# Patient Record
Sex: FEMALE | Race: BLACK OR AFRICAN AMERICAN | NOT HISPANIC OR LATINO | Employment: FULL TIME | URBAN - METROPOLITAN AREA
[De-identification: names, ages, dates, MRNs, and addresses within clinical notes are randomized per-mention and may not be internally consistent; named-entity substitution may affect disease eponyms.]

---

## 2018-07-01 ENCOUNTER — HOSPITAL ENCOUNTER (EMERGENCY)
Facility: HOSPITAL | Age: 33
Discharge: HOME | End: 2018-07-01
Attending: EMERGENCY MEDICINE
Payer: MEDICAID

## 2018-07-01 VITALS
BODY MASS INDEX: 36.29 KG/M2 | OXYGEN SATURATION: 98 % | TEMPERATURE: 98 F | SYSTOLIC BLOOD PRESSURE: 131 MMHG | WEIGHT: 180 LBS | RESPIRATION RATE: 16 BRPM | HEIGHT: 59 IN | HEART RATE: 72 BPM | DIASTOLIC BLOOD PRESSURE: 67 MMHG

## 2018-07-01 DIAGNOSIS — O03.4 INEVITABLE ABORTION: Primary | ICD-10-CM

## 2018-07-01 LAB
ALBUMIN SERPL BCP-MCNC: 3.6 G/DL
ALP SERPL-CCNC: 57 U/L
ALT SERPL W/O P-5'-P-CCNC: 13 U/L
ANION GAP SERPL CALC-SCNC: 11 MMOL/L
AST SERPL-CCNC: 12 U/L
B-HCG UR QL: POSITIVE
BACTERIA #/AREA URNS HPF: ABNORMAL /HPF
BASOPHILS # BLD AUTO: 0.01 K/UL
BASOPHILS NFR BLD: 0.2 %
BILIRUB SERPL-MCNC: 0.5 MG/DL
BILIRUB UR QL STRIP: ABNORMAL
BUN SERPL-MCNC: 7 MG/DL
CALCIUM SERPL-MCNC: 9.4 MG/DL
CHLORIDE SERPL-SCNC: 104 MMOL/L
CLARITY UR: CLEAR
CO2 SERPL-SCNC: 19 MMOL/L
COLOR UR: ABNORMAL
CREAT SERPL-MCNC: 0.8 MG/DL
CTP QC/QA: YES
DIFFERENTIAL METHOD: ABNORMAL
EOSINOPHIL # BLD AUTO: 0 K/UL
EOSINOPHIL NFR BLD: 0.7 %
ERYTHROCYTE [DISTWIDTH] IN BLOOD BY AUTOMATED COUNT: 13.8 %
EST. GFR  (AFRICAN AMERICAN): >60 ML/MIN/1.73 M^2
EST. GFR  (NON AFRICAN AMERICAN): >60 ML/MIN/1.73 M^2
GLUCOSE SERPL-MCNC: 99 MG/DL
GLUCOSE UR QL STRIP: NEGATIVE
HCG INTACT+B SERPL-ACNC: 8527 MIU/ML
HCT VFR BLD AUTO: 32.9 %
HGB BLD-MCNC: 10.6 G/DL
HGB UR QL STRIP: ABNORMAL
HYALINE CASTS #/AREA URNS LPF: 0 /LPF
KETONES UR QL STRIP: NEGATIVE
LEUKOCYTE ESTERASE UR QL STRIP: NEGATIVE
LYMPHOCYTES # BLD AUTO: 1.2 K/UL
LYMPHOCYTES NFR BLD: 28.6 %
MCH RBC QN AUTO: 28.6 PG
MCHC RBC AUTO-ENTMCNC: 32.2 G/DL
MCV RBC AUTO: 89 FL
MICROSCOPIC COMMENT: ABNORMAL
MONOCYTES # BLD AUTO: 0.4 K/UL
MONOCYTES NFR BLD: 9.2 %
NEUTROPHILS # BLD AUTO: 2.7 K/UL
NEUTROPHILS NFR BLD: 61.1 %
NITRITE UR QL STRIP: NEGATIVE
PH UR STRIP: 6 [PH] (ref 5–8)
PLATELET # BLD AUTO: 243 K/UL
PMV BLD AUTO: 11.5 FL
POTASSIUM SERPL-SCNC: 3.4 MMOL/L
PROT SERPL-MCNC: 8.1 G/DL
PROT UR QL STRIP: ABNORMAL
RBC # BLD AUTO: 3.7 M/UL
RBC #/AREA URNS HPF: 40 /HPF (ref 0–4)
SODIUM SERPL-SCNC: 134 MMOL/L
SP GR UR STRIP: >=1.03 (ref 1–1.03)
SQUAMOUS #/AREA URNS HPF: 4 /HPF
URN SPEC COLLECT METH UR: ABNORMAL
UROBILINOGEN UR STRIP-ACNC: NEGATIVE EU/DL
WBC # BLD AUTO: 4.34 K/UL
WBC #/AREA URNS HPF: 1 /HPF (ref 0–5)

## 2018-07-01 PROCEDURE — 99284 EMERGENCY DEPT VISIT MOD MDM: CPT | Mod: 25

## 2018-07-01 PROCEDURE — 84702 CHORIONIC GONADOTROPIN TEST: CPT

## 2018-07-01 PROCEDURE — 80053 COMPREHEN METABOLIC PANEL: CPT

## 2018-07-01 PROCEDURE — 85025 COMPLETE CBC W/AUTO DIFF WBC: CPT

## 2018-07-01 PROCEDURE — 81000 URINALYSIS NONAUTO W/SCOPE: CPT

## 2018-07-01 PROCEDURE — 25000003 PHARM REV CODE 250: Performed by: PHYSICIAN ASSISTANT

## 2018-07-01 PROCEDURE — 96361 HYDRATE IV INFUSION ADD-ON: CPT

## 2018-07-01 PROCEDURE — 81025 URINE PREGNANCY TEST: CPT | Performed by: PHYSICIAN ASSISTANT

## 2018-07-01 PROCEDURE — 96360 HYDRATION IV INFUSION INIT: CPT

## 2018-07-01 RX ORDER — IBUPROFEN 600 MG/1
600 TABLET ORAL EVERY 6 HOURS PRN
Qty: 20 TABLET | Refills: 0 | Status: SHIPPED | OUTPATIENT
Start: 2018-07-01

## 2018-07-01 RX ORDER — SODIUM CHLORIDE 9 MG/ML
125 INJECTION, SOLUTION INTRAVENOUS CONTINUOUS
Status: DISCONTINUED | OUTPATIENT
Start: 2018-07-01 | End: 2018-07-01 | Stop reason: HOSPADM

## 2018-07-01 RX ADMIN — SODIUM CHLORIDE 125 ML/HR: 0.9 INJECTION, SOLUTION INTRAVENOUS at 07:07

## 2018-07-01 NOTE — ED TRIAGE NOTES
Pt presents to ED with complaint of vaginal bleeding onset overnight, noticed this morning, with clots.  Also reports abdominal cramping.  Pt reports 6 weeks pregnant, pt of  at [facility].  States had ultrasound done recently.

## 2018-07-01 NOTE — ED PROVIDER NOTES
Encounter Date: 2018       History     Chief Complaint   Patient presents with    Vaginal Bleeding     6 weeks pregnant started with vaginal bleeding this morning     Afebrile 33-year-old female that is  3 para 1 with current pregnancy at approximately 8 weeks gestation by ultrasound however approximately 10 weeks by her last menstrual period presents to the ED for evaluation of vaginal bleeding.  She states bleeding began this morning upon waking and was noted to be a moderate amount.  She did report passing tissue and clots when she went to use the restroom.  She complains of associated lower abdominal cramping.  She states that her Ob is Dr. Woodward with [facility].  She denies any other symptoms at this time including fever, chills, nausea, vomiting, dysuria, hematuria or vaginal discharge. She has not tried any medications for the symptoms.      The history is provided by the patient.     Review of patient's allergies indicates:  No Known Allergies  History reviewed. No pertinent past medical history.  History reviewed. No pertinent surgical history.  History reviewed. No pertinent family history.  Social History   Substance Use Topics    Smoking status: Never Smoker    Smokeless tobacco: Not on file    Alcohol use Not on file     Review of Systems   Constitutional: Negative for chills and fever.   Respiratory: Negative for shortness of breath.    Cardiovascular: Negative for chest pain.   Gastrointestinal: Positive for abdominal pain (Abdominal cramping). Negative for nausea and vomiting.   Genitourinary: Positive for vaginal bleeding. Negative for dysuria, flank pain, hematuria and vaginal discharge.   Musculoskeletal: Positive for back pain. Negative for arthralgias and myalgias.   Skin: Negative for rash.   Neurological: Negative for weakness, light-headedness and headaches.   Hematological: Does not bruise/bleed easily.       Physical Exam     Initial Vitals [18 0710]   BP Pulse  Resp Temp SpO2   138/75 97 16 98.1 °F (36.7 °C) 99 %      MAP       --         Physical Exam    Nursing note and vitals reviewed.  Constitutional: Vital signs are normal. She appears well-developed and well-nourished. She is cooperative.  Non-toxic appearance. She does not appear ill.   Patient appears anxious and in mild distress   HENT:   Head: Normocephalic and atraumatic.   Eyes: Conjunctivae and lids are normal.   Neck: Neck supple. No neck rigidity.   Cardiovascular: Normal rate and regular rhythm.   Pulmonary/Chest: Breath sounds normal. No respiratory distress. She has no wheezes. She has no rhonchi.   Abdominal: Soft. Normal appearance. There is no tenderness. There is no rigidity, no rebound, no guarding and no CVA tenderness.   Genitourinary: Pelvic exam was performed with patient supine. Uterus is not tender. Right adnexum displays no tenderness. Left adnexum displays no tenderness. There is bleeding in the vagina.   Genitourinary Comments: Scant blood in the vaginal vault; no tissue or clots noted.  Cervical os is open   Musculoskeletal: Normal range of motion.   Neurological: She is alert and oriented to person, place, and time. GCS eye subscore is 4. GCS verbal subscore is 5. GCS motor subscore is 6.   Skin: Skin is warm, dry and intact. No rash noted.   Psychiatric: She has a normal mood and affect. Her speech is normal and behavior is normal. Thought content normal.         ED Course   Procedures  Labs Reviewed   CBC W/ AUTO DIFFERENTIAL - Abnormal; Notable for the following:        Result Value    RBC 3.70 (*)     Hemoglobin 10.6 (*)     Hematocrit 32.9 (*)     All other components within normal limits   COMPREHENSIVE METABOLIC PANEL - Abnormal; Notable for the following:     Sodium 134 (*)     Potassium 3.4 (*)     CO2 19 (*)     All other components within normal limits   URINALYSIS - Abnormal; Notable for the following:     Color, UA Brown (*)     Specific Gravity, UA >=1.030 (*)     Protein, UA  1+ (*)     Bilirubin (UA) 1+ (*)     Occult Blood UA 3+ (*)     All other components within normal limits   URINALYSIS MICROSCOPIC - Abnormal; Notable for the following:     RBC, UA 40 (*)     All other components within normal limits   POCT URINE PREGNANCY - Abnormal; Notable for the following:     POC Preg Test, Ur Positive (*)     All other components within normal limits   HCG, QUANTITATIVE, PREGNANCY          Imaging Results          US OB Less Than 14 Wks with Transvag(xpd (Final result)  Result time 18 10:52:01    Final result by Jesús Steven MD (18 10:52:01)                 Impression:      Pregnancy of unknown location.  No gestational sac is visualized.  No adnexal masses visualized.  Differential considerations include recent , or unseen ectopic pregnancy.  Given beta hCG 8500 and the finding small amount echogenic material with increased vascularity the region the endometrial canal, findings are suspicious for recent  with a small amount of retained products of conception.  Continued follow-up with serial beta HCG is recommended and repeat ultrasound as clinically indicated.      Electronically signed by: Jesús Steven MD  Date:    2018  Time:    10:52             Narrative:    EXAMINATION:  US OB LESS THAN 14 WKS WITH TRANSVAGINAL (XPD)    CLINICAL HISTORY:  Vag Bleeding;    TECHNIQUE:  Transabdominal sonography of the pelvis was performed, followed by transvaginal sonography to better evaluate the uterus and ovaries.    COMPARISON:  None.    FINDINGS:  No gestational sac is visualized.  No evidence of adnexal masses visualized.  There is a small amount of echogenic material within the endometrial canal with increased vascularity which may represent hemorrhagic material.    Right ovary: Normal.    Left ovary: Normal.    Miscellaneous: No Free Fluid.                                 Medical Decision Making:   Initial Assessment:   33-year-old female with  reported pregnancy at at 8 weeks gestation by ultrasound presents to the ED for evaluation of vaginal bleeding that began today.  Patient is overall well-appearing however in some minimal distress secondary to pain. Abdomen is soft and nontender.  exam reveals cervical os open and scant blood in the vaginal vault; no tissue visualized.  Differential Diagnosis:   Differential Diagnosis includes, but is not limited to:  Pregnancy complication (threatened AB, inevitable AB, incomplete Ab, missed AB, ectopic pregnancy, placenta previa)  foreign body, trauma.    Clinical Tests:   Lab Tests: Ordered and Reviewed  Radiological Study: Ordered and Reviewed  ED Management:  I do note I am unable see ultrasound however patient reports that she had confirmedIUP with this pregnancy by ultrasound however as she has an acute change of vaginal bleeding we will obtain blood work and ultrasound today.  Patient has lab work at this facility revealing A positive blood type.  HCG at 8527 an ultrasound with no confirmed IUP an material near the endometrial canal consistent with inevitable AB and low suspicion of ectopic as she did report previous IUP via ultrasound is completed 2 weeks ago.  She did report some lessening and pain in the ED and patient remains hemodynamically stable. We did discuss that she should follow up with her Ob for repeat blood work and ultrasound.  We will send patient home with NSAIDs as there does not appear to be viable pregnancy and this will help her cramping. Strict instructions to follow up with primary care physician or reference provided for further assessment and evaluation. Given instructions to return for any acute symptoms and verbalized understanding of this medical plan.    Other:   I have discussed this case with another health care provider.              Attending Attestation:     Physician Attestation Statement for NP/PA:   I have conducted a face to face encounter with this patient in  addition to the NP/PA, due to NP/PA Request    Other NP/PA Attestation Additions:    History of Present Illness: Agree; 33-year-old female presents emergency department complaining of vaginal bleeding.  She is about 8 weeks by ultrasound.  Reports a moderate amount of bleeding and some lower abdominal cramping pain.  Believe she may have passed some tissue as well as bleeding which was initially heavier menses but is now about the same as her menses.  No other symptoms reported.   Physical Exam: Agree   Medical Decision Making: Agree; ultrasound concerning for completed miscarriage given that the patient reports she had a prior ultrasound at an outside hospital that we cannot view.  Instructed to follow up with OB, informed of results, informed of home management, reasons to return to the emergency room                    Clinical Impression:   The encounter diagnosis was Inevitable .                             SABAS Fraser  18 [id number]       Dontrell Hickman MD  18 [id number]

## 2018-11-23 ENCOUNTER — HOSPITAL ENCOUNTER (EMERGENCY)
Facility: HOSPITAL | Age: 33
Discharge: HOME | End: 2018-11-23
Attending: EMERGENCY MEDICINE
Payer: MEDICAID

## 2018-11-23 VITALS
OXYGEN SATURATION: 99 % | HEIGHT: 59 IN | HEART RATE: 80 BPM | WEIGHT: 157 LBS | TEMPERATURE: 99 F | SYSTOLIC BLOOD PRESSURE: 128 MMHG | DIASTOLIC BLOOD PRESSURE: 76 MMHG | BODY MASS INDEX: 31.65 KG/M2 | RESPIRATION RATE: 18 BRPM

## 2018-11-23 DIAGNOSIS — L73.2 HIDRADENITIS SUPPURATIVA: Primary | ICD-10-CM

## 2018-11-23 LAB
B-HCG UR QL: NEGATIVE
CTP QC/QA: YES

## 2018-11-23 PROCEDURE — 25000003 PHARM REV CODE 250: Performed by: PHYSICIAN ASSISTANT

## 2018-11-23 PROCEDURE — 81025 URINE PREGNANCY TEST: CPT | Performed by: NURSE PRACTITIONER

## 2018-11-23 PROCEDURE — 99284 EMERGENCY DEPT VISIT MOD MDM: CPT | Mod: 25

## 2018-11-23 PROCEDURE — 10060 I&D ABSCESS SIMPLE/SINGLE: CPT

## 2018-11-23 RX ORDER — LIDOCAINE HYDROCHLORIDE 10 MG/ML
10 INJECTION INFILTRATION; PERINEURAL
Status: COMPLETED | OUTPATIENT
Start: 2018-11-23 | End: 2018-11-23

## 2018-11-23 RX ORDER — FLUCONAZOLE 200 MG/1
200 TABLET ORAL ONCE
Qty: 1 TABLET | Refills: 0 | Status: SHIPPED | OUTPATIENT
Start: 2018-11-23 | End: 2018-11-23

## 2018-11-23 RX ORDER — SULFAMETHOXAZOLE AND TRIMETHOPRIM 800; 160 MG/1; MG/1
1 TABLET ORAL
Status: COMPLETED | OUTPATIENT
Start: 2018-11-23 | End: 2018-11-23

## 2018-11-23 RX ORDER — SULFAMETHOXAZOLE AND TRIMETHOPRIM 800; 160 MG/1; MG/1
1 TABLET ORAL 2 TIMES DAILY
Qty: 14 TABLET | Refills: 0 | Status: SHIPPED | OUTPATIENT
Start: 2018-11-23 | End: 2018-11-30

## 2018-11-23 RX ADMIN — LIDOCAINE HYDROCHLORIDE 10 ML: 10 INJECTION, SOLUTION INFILTRATION; PERINEURAL at 04:11

## 2018-11-23 RX ADMIN — SULFAMETHOXAZOLE AND TRIMETHOPRIM 1 TABLET: 800; 160 TABLET ORAL at 04:11

## 2018-11-23 NOTE — ED PROVIDER NOTES
Encounter Date: 11/23/2018       History     Chief Complaint   Patient presents with    Abscess     to right axilla onset one week +rednes +swelling +pain -drainage -fever     Rhea Sandra, a 33 y.o. female that presents to the ED for evaluation of possible abscess to right axillary region x 1 week.  Patient states that she's had an abscess to this area several times in the past.  She states that she has had increased swelling, and anne dness to the site.  She denies any fever.  No treatments tried prior to arrival.          The history is provided by the patient.     Review of patient's allergies indicates:  No Known Allergies  No past medical history on file.  No past surgical history on file.  History reviewed. No pertinent family history.  Social History     Tobacco Use    Smoking status: Never Smoker   Substance Use Topics    Alcohol use: Not on file    Drug use: Not on file     Review of Systems   Constitutional: Negative for chills and fever.   Gastrointestinal: Negative for nausea and vomiting.   Skin: Positive for color change.   Allergic/Immunologic: Negative for immunocompromised state.   Neurological: Negative for weakness and light-headedness.   Psychiatric/Behavioral: Negative for agitation and confusion.   All other systems reviewed and are negative.      Physical Exam     Initial Vitals [11/23/18 1513]   BP Pulse Resp Temp SpO2   127/78 82 18 98.8 °F (37.1 °C) 100 %      MAP       --         Physical Exam    Nursing note and vitals reviewed.  Constitutional: She appears well-developed and well-nourished.   HENT:   Head: Normocephalic and atraumatic.   Right Ear: External ear normal.   Left Ear: External ear normal.   Nose: Nose normal.   Mouth/Throat: Oropharynx is clear and moist.   Eyes: Conjunctivae and EOM are normal.   Neck: Normal range of motion.   Cardiovascular: Normal rate, regular rhythm and normal heart sounds. Exam reveals no gallop and no friction rub.    No murmur  heard.  Pulmonary/Chest: Breath sounds normal. No respiratory distress. She has no wheezes. She has no rhonchi. She has no rales.   Abdominal: Soft. Bowel sounds are normal. She exhibits no distension. There is no tenderness. There is no rebound and no guarding.   Musculoskeletal: Normal range of motion.   Neurological: She is alert and oriented to person, place, and time.   Skin: Skin is warm and dry. Capillary refill takes less than 2 seconds. Abscess noted. No rash noted. No erythema.   Induration and fluctuance noted to right axilla measuring approximately 3 cm x 3 cm with erythema spreading to proximal upper extremity about 10 cm total.   Psychiatric: She has a normal mood and affect. Thought content normal.         ED Course   I & D - Incision and Drainage  Date/Time: 11/23/2018 5:37 PM  Location procedure was performed: [facility] EMERGENCY DEPARTMENT  Performed by: Marlene Rust PA-C  Authorized by: Fernando Garvey MD   Consent Done: Yes  Risks and benefits: risks, benefits and alternatives were discussed  Consent given by: patient  Patient identity confirmed: verbally with patient  Type: abscess  Body area: upper extremity (right axilla )  Anesthesia: local infiltration    Anesthesia:  Local Anesthetic: lidocaine 1% without epinephrine  Risk factor: underlying major vessel  Scalpel size: 11  Incision type: single straight  Complexity: simple  Drainage: pus  Drainage amount: moderate  Wound treatment: incision,  deloculation,  expression of material and  wound packed  Packing material: 1/4 in gauze  Complications: No  Specimens: No  Implants: No  Patient tolerance: Patient tolerated the procedure well with no immediate complications        Labs Reviewed   POCT URINE PREGNANCY          Imaging Results    None          Medical Decision Making:   Initial Assessment:   Swelling, induration and erythema to right axilla  Differential Diagnosis:   Abscess, cellulitis, lymph node swelling  ED  Management:  Patient presents to the ED for evaluation of right axillary swelling and redness.  Symptoms and exam most consistent with hidradenitis suppurativa.  Area was I indeed with expression of moderate amount of pus.  Area was de loculated and packing was placed.  She got her 1st dose of Bactrim in the ER.  She will be woke given a prescription for Bactrim and was instructed to take ibuprofen and Tylenol as needed for pain.  Instructed to return in 2-3 days for packing removal.  She was given surgeon's name for possible flap procedure.  Given instructions to return including increased redness, fever, drainage is expressed understanding.                      Clinical Impression:   The encounter diagnosis was Hidradenitis suppurativa.                             Marlene Rust PA-C  11/23/18 [id number]

## 2019-09-30 ENCOUNTER — HOSPITAL ENCOUNTER (EMERGENCY)
Facility: HOSPITAL | Age: 34
Discharge: HOME | End: 2019-09-30
Attending: EMERGENCY MEDICINE

## 2019-09-30 VITALS
OXYGEN SATURATION: 99 % | TEMPERATURE: 99 F | RESPIRATION RATE: 18 BRPM | BODY MASS INDEX: 32.72 KG/M2 | DIASTOLIC BLOOD PRESSURE: 66 MMHG | HEART RATE: 75 BPM | WEIGHT: 162 LBS | SYSTOLIC BLOOD PRESSURE: 116 MMHG

## 2019-09-30 DIAGNOSIS — N92.1 MENOMETRORRHAGIA: Primary | ICD-10-CM

## 2019-09-30 LAB
ABO + RH BLD: NORMAL
ALBUMIN SERPL BCP-MCNC: 3.8 G/DL (ref 3.5–5.2)
ALP SERPL-CCNC: 48 U/L (ref 55–135)
ALT SERPL W/O P-5'-P-CCNC: 14 U/L (ref 10–44)
ANION GAP SERPL CALC-SCNC: 11 MMOL/L (ref 8–16)
AST SERPL-CCNC: 21 U/L (ref 10–40)
B-HCG UR QL: NEGATIVE
BACTERIA #/AREA URNS HPF: ABNORMAL /HPF
BACTERIA GENITAL QL WET PREP: ABNORMAL
BASOPHILS # BLD AUTO: 0.01 K/UL (ref 0–0.2)
BASOPHILS NFR BLD: 0.4 % (ref 0–1.9)
BILIRUB SERPL-MCNC: 0.4 MG/DL (ref 0.1–1)
BILIRUB UR QL STRIP: NEGATIVE
BUN SERPL-MCNC: 9 MG/DL (ref 6–20)
CALCIUM SERPL-MCNC: 9.1 MG/DL (ref 8.7–10.5)
CHLORIDE SERPL-SCNC: 108 MMOL/L (ref 95–110)
CLARITY UR: CLEAR
CLUE CELLS VAG QL WET PREP: ABNORMAL
CO2 SERPL-SCNC: 18 MMOL/L (ref 23–29)
COLOR UR: YELLOW
CREAT SERPL-MCNC: 0.7 MG/DL (ref 0.5–1.4)
CTP QC/QA: YES
DIFFERENTIAL METHOD: ABNORMAL
EOSINOPHIL # BLD AUTO: 0.1 K/UL (ref 0–0.5)
EOSINOPHIL NFR BLD: 1.9 % (ref 0–8)
ERYTHROCYTE [DISTWIDTH] IN BLOOD BY AUTOMATED COUNT: 13.9 % (ref 11.5–14.5)
EST. GFR  (AFRICAN AMERICAN): >60 ML/MIN/1.73 M^2
EST. GFR  (NON AFRICAN AMERICAN): >60 ML/MIN/1.73 M^2
FILAMENT FUNGI VAG WET PREP-#/AREA: ABNORMAL
GLUCOSE SERPL-MCNC: 88 MG/DL (ref 70–110)
GLUCOSE UR QL STRIP: NEGATIVE
HCG INTACT+B SERPL-ACNC: <1.2 MIU/ML
HCT VFR BLD AUTO: 36.8 % (ref 37–48.5)
HGB BLD-MCNC: 11.8 G/DL (ref 12–16)
HGB UR QL STRIP: ABNORMAL
KETONES UR QL STRIP: NEGATIVE
LEUKOCYTE ESTERASE UR QL STRIP: NEGATIVE
LYMPHOCYTES # BLD AUTO: 1 K/UL (ref 1–4.8)
LYMPHOCYTES NFR BLD: 39.4 % (ref 18–48)
MCH RBC QN AUTO: 29.2 PG (ref 27–31)
MCHC RBC AUTO-ENTMCNC: 32.1 G/DL (ref 32–36)
MCV RBC AUTO: 91 FL (ref 82–98)
MICROSCOPIC COMMENT: ABNORMAL
MONOCYTES # BLD AUTO: 0.2 K/UL (ref 0.3–1)
MONOCYTES NFR BLD: 8.7 % (ref 4–15)
NEUTROPHILS # BLD AUTO: 1.3 K/UL (ref 1.8–7.7)
NEUTROPHILS NFR BLD: 49.6 % (ref 38–73)
NITRITE UR QL STRIP: NEGATIVE
PH UR STRIP: 6 [PH] (ref 5–8)
PLATELET # BLD AUTO: 217 K/UL (ref 150–350)
PMV BLD AUTO: 12.3 FL (ref 9.2–12.9)
POTASSIUM SERPL-SCNC: 4 MMOL/L (ref 3.5–5.1)
PROT SERPL-MCNC: 8.1 G/DL (ref 6–8.4)
PROT UR QL STRIP: NEGATIVE
RBC # BLD AUTO: 4.04 M/UL (ref 4–5.4)
RBC #/AREA URNS HPF: 50 /HPF (ref 0–4)
SODIUM SERPL-SCNC: 137 MMOL/L (ref 136–145)
SP GR UR STRIP: 1.02 (ref 1–1.03)
SPECIMEN SOURCE: ABNORMAL
T VAGINALIS GENITAL QL WET PREP: ABNORMAL
URN SPEC COLLECT METH UR: ABNORMAL
UROBILINOGEN UR STRIP-ACNC: NEGATIVE EU/DL
WBC # BLD AUTO: 2.64 K/UL (ref 3.9–12.7)
WBC #/AREA URNS HPF: 2 /HPF (ref 0–5)
WBC #/AREA VAG WET PREP: ABNORMAL
YEAST GENITAL QL WET PREP: ABNORMAL

## 2019-09-30 PROCEDURE — 87210 SMEAR WET MOUNT SALINE/INK: CPT

## 2019-09-30 PROCEDURE — 81000 URINALYSIS NONAUTO W/SCOPE: CPT

## 2019-09-30 PROCEDURE — 99285 EMERGENCY DEPT VISIT HI MDM: CPT | Mod: 25

## 2019-09-30 PROCEDURE — 87491 CHLMYD TRACH DNA AMP PROBE: CPT

## 2019-09-30 PROCEDURE — 84702 CHORIONIC GONADOTROPIN TEST: CPT

## 2019-09-30 PROCEDURE — 86901 BLOOD TYPING SEROLOGIC RH(D): CPT

## 2019-09-30 PROCEDURE — 85025 COMPLETE CBC W/AUTO DIFF WBC: CPT

## 2019-09-30 PROCEDURE — 80053 COMPREHEN METABOLIC PANEL: CPT

## 2019-09-30 PROCEDURE — 81025 URINE PREGNANCY TEST: CPT | Performed by: EMERGENCY MEDICINE

## 2019-09-30 NOTE — ED NOTES
Pt given gown and instructed to change into it and remove all clothing from the waist down. Pelvic exam set up and procedure explained to pt.  Pt verbalized understanding

## 2019-09-30 NOTE — ED TRIAGE NOTES
Pt presents to ED today report several + home UPT on Friday. LMP on 8/26/2019. Pt reports + lines were very faint on each test. Pt reports vaginal bleeding onset yesterday.

## 2019-09-30 NOTE — ED PROVIDER NOTES
"Encounter Date: 9/30/2019    SCRIBE #1 NOTE: I, Jesus Ellsworth, am scribing for, and in the presence of, Nicanor Beauchamp MD.       History     Chief Complaint   Patient presents with    Vaginal Bleeding     34 year old female presents to ed cc of vaginal bleeding with mild abdominal cramping that began today patient states is currently pregnant but unsure of how far along has not seen obgyn for this pregnancy      34 year-old AA female presents to the ED for cc vaginal bleeding that started today. Patient reports light vaginal bleeding with associated mild lower abdominal cramping. States she had a recent, questionable positive home pregnancy test (pt states that the line was "faint" and she is not sure if it was positive or not) . States her last menstrual cycle was supposed to be on 08/20. She reports prior history of miscarriages (2). She denies vaginal discharge, dysuria, N/V/D, chest pain, SOB,  symptoms, weakness, fatigue. Pt denies any significant Ob/Gyn hx when questioned.     The history is provided by the patient.     Review of patient's allergies indicates:  No Known Allergies  History reviewed. No pertinent past medical history.  History reviewed. No pertinent surgical history.  History reviewed. No pertinent family history.  Social History     Tobacco Use    Smoking status: Never Smoker   Substance Use Topics    Alcohol use: Not on file    Drug use: Not on file     Review of Systems   Constitutional: Negative for fever.   HENT: Negative for sore throat.    Respiratory: Negative for shortness of breath.    Cardiovascular: Negative for chest pain.   Gastrointestinal: Positive for abdominal pain. Negative for diarrhea, nausea and vomiting.   Genitourinary: Positive for vaginal bleeding. Negative for dysuria, vaginal discharge and vaginal pain.   Musculoskeletal: Negative for back pain, myalgias and neck pain.   Skin: Negative for rash.   Neurological: Negative for weakness.   Hematological: Does not " bruise/bleed easily.   All other systems reviewed and are negative.      Physical Exam     Initial Vitals [09/30/19 0933]   BP Pulse Resp Temp SpO2   128/71 75 20 98.2 °F (36.8 °C) 98 %      MAP       --         Physical Exam    Nursing note and vitals reviewed.  Constitutional: She appears well-developed and well-nourished. No distress.   HENT:   Head: Normocephalic and atraumatic.   Right Ear: External ear normal.   Left Ear: External ear normal.   Eyes: Conjunctivae and EOM are normal. Pupils are equal, round, and reactive to light.   Neck: Normal range of motion. Neck supple.   Cardiovascular: Normal rate, regular rhythm, normal heart sounds and intact distal pulses.   Pulmonary/Chest: Breath sounds normal. No respiratory distress. She has no wheezes.   Abdominal: Soft. She exhibits no distension. There is no tenderness.   No suprapubic tenderness   Genitourinary:   Genitourinary Comments: Positive blood in the vaginal vault.  Some blood noted coming from external os.  No tissue, lesions, or laceration.  No adnexal tenderness.  No CMT   Musculoskeletal: Normal range of motion. She exhibits no edema.   Neurological: She is alert and oriented to person, place, and time.   Skin: Skin is warm and dry.         ED Course   Procedures  Labs Reviewed   CBC W/ AUTO DIFFERENTIAL - Abnormal; Notable for the following components:       Result Value    WBC 2.64 (*)     Hemoglobin 11.8 (*)     Hematocrit 36.8 (*)     Gran # (ANC) 1.3 (*)     Mono # 0.2 (*)     All other components within normal limits   COMPREHENSIVE METABOLIC PANEL - Abnormal; Notable for the following components:    CO2 18 (*)     Alkaline Phosphatase 48 (*)     All other components within normal limits   URINALYSIS, REFLEX TO URINE CULTURE - Abnormal; Notable for the following components:    Occult Blood UA 3+ (*)     All other components within normal limits    Narrative:     Preferred Collection Type->Urine, Clean Catch   URINALYSIS MICROSCOPIC -  Abnormal; Notable for the following components:    RBC, UA 50 (*)     All other components within normal limits    Narrative:     Preferred Collection Type->Urine, Clean Catch   VAGINAL SCREEN - Abnormal; Notable for the following components:    WBC - Vaginal Screen Few (*)     Bacteria - Vaginal Screen Many (*)     All other components within normal limits   C. TRACHOMATIS/N. GONORRHOEAE BY AMP DNA   HCG, QUANTITATIVE, PREGNANCY   POCT URINE PREGNANCY   GROUP & RH          Imaging Results    None          Medical Decision Making:   Clinical Tests:   Lab Tests: Reviewed and Ordered  ED Management:  - UPT neg  - CBC w/diff WNL; H/H stable; no significant leukocytosis  - CMP WNL; no significant electrolyte abnormalities  - B-HCG < 1.2  - ABO/RH: A pos  - UA without findings for infection  - Wet prep negative for BV, Trichomonas, or yeast  - Vaginal exam unremarkable other than scan blood in vaginal vault; no CMT tenderness; no adnexal tenderness on bimanual exam   - Physical exam otherwise unremarkable; no abdominal tenderness or suprapubic tenderness  - Findings most consistent with menometorrhagia,  - Pt stable throughout ED stay; no pain complaints  - Will have pt f/u with her Ob/Gyn in then next 2-3 days for recheck of today's complaints   - No further intervention is indicated at this time after having taken into account the patient's history, physical exam findings, and empirical and objective data obtained during the patient's emergency department workup.   - The patient is at low risk for an emergent medical condition at this time, and I am of the belief that that it is safe to discharge the patient from the emergency department.   - The patient is instructed to follow up as outpatient as indicated on the discharge paperwork.    - I have discussed the specifics of the workup with the patient and the patient has verbalized understanding of the details of the workup, the diagnosis, the treatment plan, and the  need for outpatient follow-up.    - Although the patient has no emergent etiology today this does not preclude the development of an emergent condition so, in addition, I have advised the patient that they can return to the ED and/or activate EMS at any time with worsening of their symptoms, change of their symptoms, or with any other medical complaint.    - The patient remained comfortable and stable during their visit in the ED.    - Discharge and follow-up instructions discussed with the patient who expressed understanding and willingness to comply with my recommendations.  - Results of all emergency department tests  discussed thoroughly with patient; all patient questions answered; pt in agreement with plan  - Pt instructed to follow up with PCP in 2-3 days for recheck of today's complaints  - Pt given strict emergency department return precautions for any new or worsening of symptoms  - Pt discharged from the emergency department in stable condition, in no acute distress                          Clinical Impression:       ICD-10-CM ICD-9-CM   1. Menometrorrhagia N92.1 626.2           Disposition:   Disposition: Discharged  Condition: Stable    I, Nicanor Beauchamp,  personally performed the services described in this documentation. All medical record entries made by the scribe were at my direction and in my presence.  I have reviewed the chart and agree that the record reflects my personal performance and is accurate and complete. Nicanor Beauchamp M.D. 2:09 PM09/30/2019               Nicanor Beauchamp MD  09/30/19 [id number]

## 2019-10-01 LAB
C TRACH DNA SPEC QL NAA+PROBE: NOT DETECTED
N GONORRHOEA DNA SPEC QL NAA+PROBE: NOT DETECTED

## 2020-06-04 ENCOUNTER — LAB VISIT (OUTPATIENT)
Dept: PRIMARY CARE CLINIC | Facility: OTHER | Age: 35
End: 2020-06-04
Payer: MEDICAID

## 2020-06-04 DIAGNOSIS — Z11.59 SCREENING FOR VIRAL DISEASE: ICD-10-CM

## 2020-06-04 PROCEDURE — U0003 INFECTIOUS AGENT DETECTION BY NUCLEIC ACID (DNA OR RNA); SEVERE ACUTE RESPIRATORY SYNDROME CORONAVIRUS 2 (SARS-COV-2) (CORONAVIRUS DISEASE [COVID-19]), AMPLIFIED PROBE TECHNIQUE, MAKING USE OF HIGH THROUGHPUT TECHNOLOGIES AS DESCRIBED BY CMS-2020-01-R: HCPCS

## 2020-06-05 LAB — SARS-COV-2 RNA RESP QL NAA+PROBE: NOT DETECTED

## 2021-01-01 ENCOUNTER — HOSPITAL ENCOUNTER (EMERGENCY)
Facility: HOSPITAL | Age: 36
Discharge: HOME | End: 2021-01-01
Attending: EMERGENCY MEDICINE
Payer: MEDICAID

## 2021-01-01 VITALS
HEIGHT: 59 IN | OXYGEN SATURATION: 99 % | HEART RATE: 82 BPM | BODY MASS INDEX: 34.88 KG/M2 | RESPIRATION RATE: 18 BRPM | SYSTOLIC BLOOD PRESSURE: 130 MMHG | TEMPERATURE: 98 F | WEIGHT: 173 LBS | DIASTOLIC BLOOD PRESSURE: 78 MMHG

## 2021-01-01 DIAGNOSIS — U07.1 COVID-19 VIRUS DETECTED: ICD-10-CM

## 2021-01-01 DIAGNOSIS — U07.1 COVID-19: Primary | ICD-10-CM

## 2021-01-01 LAB
CTP QC/QA: YES
SARS-COV-2 RDRP RESP QL NAA+PROBE: POSITIVE

## 2021-01-01 PROCEDURE — U0002 COVID-19 LAB TEST NON-CDC: HCPCS | Performed by: EMERGENCY MEDICINE

## 2021-01-01 PROCEDURE — 99282 EMERGENCY DEPT VISIT SF MDM: CPT

## 2021-01-01 PROCEDURE — 99283 EMERGENCY DEPT VISIT LOW MDM: CPT

## 2021-01-01 RX ORDER — ALBUTEROL SULFATE 90 UG/1
1-2 AEROSOL, METERED RESPIRATORY (INHALATION) EVERY 6 HOURS PRN
Qty: 8 G | Refills: 0 | Status: SHIPPED | OUTPATIENT
Start: 2021-01-01 | End: 2022-01-01

## 2021-07-01 ENCOUNTER — PATIENT MESSAGE (OUTPATIENT)
Dept: ADMINISTRATIVE | Facility: OTHER | Age: 36
End: 2021-07-01

## 2021-12-27 ENCOUNTER — HOSPITAL ENCOUNTER (EMERGENCY)
Facility: HOSPITAL | Age: 36
Discharge: HOME | End: 2021-12-27
Attending: EMERGENCY MEDICINE
Payer: MEDICAID

## 2021-12-27 VITALS
BODY MASS INDEX: 34.27 KG/M2 | RESPIRATION RATE: 20 BRPM | HEIGHT: 59 IN | HEART RATE: 89 BPM | TEMPERATURE: 99 F | DIASTOLIC BLOOD PRESSURE: 72 MMHG | WEIGHT: 170 LBS | TEMPERATURE: 98 F | DIASTOLIC BLOOD PRESSURE: 69 MMHG | OXYGEN SATURATION: 98 % | SYSTOLIC BLOOD PRESSURE: 121 MMHG | RESPIRATION RATE: 18 BRPM | WEIGHT: 170 LBS | HEIGHT: 59 IN | HEART RATE: 77 BPM | SYSTOLIC BLOOD PRESSURE: 134 MMHG | OXYGEN SATURATION: 100 % | BODY MASS INDEX: 34.27 KG/M2

## 2021-12-27 DIAGNOSIS — U07.1 LAB TEST POSITIVE FOR DETECTION OF COVID-19 VIRUS: Primary | ICD-10-CM

## 2021-12-27 DIAGNOSIS — O03.9 MISCARRIAGE: ICD-10-CM

## 2021-12-27 DIAGNOSIS — O20.0 THREATENED MISCARRIAGE: ICD-10-CM

## 2021-12-27 DIAGNOSIS — N93.9 VAGINAL BLEEDING: Primary | ICD-10-CM

## 2021-12-27 LAB
B-HCG UR QL: POSITIVE
BACTERIA #/AREA URNS HPF: NORMAL /HPF
BILIRUB UR QL STRIP: NEGATIVE
CLARITY UR: CLEAR
COLOR UR: YELLOW
CTP QC/QA: YES
GLUCOSE UR QL STRIP: NEGATIVE
HCG INTACT+B SERPL-ACNC: 8296 MIU/ML
HGB UR QL STRIP: ABNORMAL
KETONES UR QL STRIP: NEGATIVE
LEUKOCYTE ESTERASE UR QL STRIP: NEGATIVE
MICROSCOPIC COMMENT: NORMAL
NITRITE UR QL STRIP: NEGATIVE
PH UR STRIP: 6 [PH] (ref 5–8)
PROT UR QL STRIP: NEGATIVE
RBC #/AREA URNS HPF: 1 /HPF (ref 0–4)
SARS-COV-2 RDRP RESP QL NAA+PROBE: POSITIVE
SP GR UR STRIP: 1.01 (ref 1–1.03)
SQUAMOUS #/AREA URNS HPF: 0 /HPF
URN SPEC COLLECT METH UR: ABNORMAL
UROBILINOGEN UR STRIP-ACNC: NEGATIVE EU/DL
WBC #/AREA URNS HPF: 1 /HPF (ref 0–5)

## 2021-12-27 PROCEDURE — U0002 COVID-19 LAB TEST NON-CDC: HCPCS | Performed by: EMERGENCY MEDICINE

## 2021-12-27 PROCEDURE — 99282 EMERGENCY DEPT VISIT SF MDM: CPT | Mod: 25

## 2021-12-27 PROCEDURE — 84702 CHORIONIC GONADOTROPIN TEST: CPT | Performed by: PHYSICIAN ASSISTANT

## 2021-12-27 PROCEDURE — 81025 URINE PREGNANCY TEST: CPT | Performed by: EMERGENCY MEDICINE

## 2021-12-27 PROCEDURE — 99284 EMERGENCY DEPT VISIT MOD MDM: CPT | Mod: 25,27

## 2021-12-27 PROCEDURE — 81000 URINALYSIS NONAUTO W/SCOPE: CPT | Performed by: EMERGENCY MEDICINE

## 2021-12-27 RX ORDER — NITROFURANTOIN 25; 75 MG/1; MG/1
100 CAPSULE ORAL 2 TIMES DAILY
Qty: 14 CAPSULE | Refills: 0 | Status: SHIPPED | OUTPATIENT
Start: 2021-12-27 | End: 2022-01-03

## 2021-12-27 NOTE — ED PROVIDER NOTES
Encounter Date: 2021    SCRIBE #1 NOTE: Dina YEN am scribing for, and in the presence of, Marlene Neumann NP.       History     Chief Complaint   Patient presents with    COVID-19 Concerns     Pt presents with loss of taste and smell and nasal congestion. Pt is 10wks pregnant . Pt denies any back or ABD pain or vaginal bleeding; no distress noted.      36-year-old female who is 10 weeks pregnant presents to the ED due to COVID-19 concerns.   Patient reports nasal congestion and a loss of taste and smell that started today. Patient denies back pain, abdominal pain, or vaginal bleeding.     The history is provided by the patient.     Review of patient's allergies indicates:  No Known Allergies  No past medical history on file.  No past surgical history on file.  No family history on file.  Social History     Tobacco Use    Smoking status: Never Smoker     Review of Systems   Constitutional: Negative for fever.   HENT: Positive for congestion. Negative for sore throat.         Loss of smell and taste   Respiratory: Negative for shortness of breath.    Cardiovascular: Negative for chest pain.   Gastrointestinal: Negative for abdominal pain and nausea.   Genitourinary: Negative for dysuria and vaginal bleeding.   Musculoskeletal: Negative for back pain.   Skin: Negative for rash.   Neurological: Negative for weakness.   Hematological: Does not bruise/bleed easily.   All other systems reviewed and are negative.    Physical Exam     Initial Vitals [21 1058]   BP Pulse Resp Temp SpO2   121/72 89 20 98.6 °F (37 °C) 98 %      MAP       --         Physical Exam    Nursing note and vitals reviewed.  Constitutional: She appears well-developed and well-nourished.   HENT:   Head: Normocephalic.   Eyes: Conjunctivae and EOM are normal. Pupils are equal, round, and reactive to light.   Neck:   Normal range of motion.  Cardiovascular: Normal rate, regular rhythm, normal heart sounds and intact distal  pulses. Exam reveals no gallop and no friction rub.    No murmur heard.  Pulmonary/Chest: Breath sounds normal. No respiratory distress. She has no wheezes. She has no rhonchi. She has no rales. She exhibits no tenderness.   Musculoskeletal:         General: Normal range of motion.      Cervical back: Normal range of motion.     Neurological: She is alert and oriented to person, place, and time. She has normal strength. GCS score is 15. GCS eye subscore is 4. GCS verbal subscore is 5. GCS motor subscore is 6.   Skin: Capillary refill takes less than 2 seconds.       ED Course   Procedures  Labs Reviewed   SARS-COV-2 RNA AMPLIFICATION, QUAL - Abnormal; Notable for the following components:       Result Value    SARS-CoV-2 RNA, Amplification, Qual Positive (*)     All other components within normal limits    Narrative:       COVID-19 result(s) called and verbal readback obtained from Mandi Diehl RN by MICHAEL 12/27/2021 11:33          Imaging Results    None          Medications - No data to display  Medical Decision Making:   Initial Assessment:   37 y/o female currently 10 weeks pregnant which presents with nasal congestion and loss of taste and smell. COVID test pending.  Differential Diagnosis:   COVID positive, COVID negative, viral uri    Clinical Tests:   Lab Tests: Ordered and Reviewed  ED Management:  Pt examined and does NOT have any respiratory distress. COVID positive. Patient given strict return precautions and voiced understanding of all discharge instructions. Pt was stable at discharge.             Scribe Attestation:   Scribe #1: I performed the above scribed service and the documentation accurately describes the services I performed. I attest to the accuracy of the note.        ED Course as of 12/27/21 1649   Mon Dec 27, 2021   1129 SARS-CoV-2 RNA, Amplification, Qual(!): Positive [AT]   1149 BP: 121/72 [AT]   1149 Temp: 98.6 °F (37 °C) [AT]   1149 Temp src: Oral [AT]   1149 Pulse: 89 [AT]   1149  Resp: 20 [AT]   1149 SpO2: 98 % [AT]      ED Course User Index  [AT] LLUVIA Taylor             Clinical Impression:   Final diagnoses:  [U07.1] Lab test positive for detection of COVID-19 virus (Primary)          ED Disposition Condition    Discharge Stable        ED Prescriptions     None        Follow-up Information     Follow up With Specialties Details Why Contact Info    primary care provider as needed   As needed     [facility] Emergency Dept Emergency Medicine  If symptoms worsen [address] [id number] [phone]         This document was produced by a scribe under my direction and in my presence. I agree with the content of the note and have made any necessary edits.     Marlene Neumann DNP, FNP-BC       LLUVIA Taylor  12/27/21 2125

## 2021-12-27 NOTE — DISCHARGE INSTRUCTIONS
Your test was POSITIVE for COVID-19 (coronavirus).       Please isolate yourself at home.  You may leave home and/or return to work once the following conditions are met:    If you were not hospitalized and are not severely immunocompromised*:  More than 10 days since symptoms first appeared AND  More than 24 hours fever free without medications AND  Symptoms have improved     If you were hospitalized OR are severely immunocompromised*:  More than 20 days since symptoms first appeared  More than 24 hours fever free without medications  Symptoms have improved    If you had no symptoms but tested positive:  More than 10 days since the date of the first positive test (20 days if severely immunocompromised).   If you develop symptoms, then use the guidelines above.     *Definition of severely immunocompromised:  Current chemotherapy for cancer  Untreated HIV with CD4 count less than 200  Combined primary immunodeficiency disorder  Prednisone more than 20 mg per day for more than 14 days  Post-transplant patients      Contact Tracing    As one of the next steps, you will receive a call or text from the [place] Department of Health ([facility]) COVID-19 Tracing Team. See the contact information below so you know not to ignore the health departments call. It is important that you contact them back immediately so they can help.      Contact Tracer Number:  [phone]  Caller ID for most carriers: [place] Dept Health     What is contact tracing?  Contact tracing is a process that helps identify everyone who has been in close contact with an infected person. Contact tracers let those people know they may have been exposed and guide them on next steps. Confidentiality is important for everyone; no one will be told who may have exposed them to the virus.  Your involvement is important. The more we know about where and how this virus is spreading, the better chance we have at stopping it from spreading further.  What does exposure  mean?  Exposure means you have been within 6 feet for more than 15 minutes with a person who has or had COVID-19.  What kind of questions do the contact tracers ask?  A contact tracer will confirm your basic contact information including name, address, phone number, and next of kin, as well as asking about any symptoms you may have had. Theyll also ask you how you think you may have gotten sick, such as places where you may have been exposed to the virus, and people you were with. Those names will never be shared with anyone outside of that call, and will only be used to help trace and stop the spread of the virus.   I have privacy concerns. How will the state use my information?  Your privacy about your health is important. All calls are completed using call centers that use the appropriate health privacy protection measures (HIPAA compliance), meaning that your patient information is safe. No one will ever ask you any questions related to immigration status. Your health comes first.   Do I have to participate?  You do not have to participate, but we strongly encourage you to. Contact tracing can help us catch and control new outbreaks as theyre developing to keep your friends and family safe.   What if I dont hear from anyone?  If you dont receive a call within 24 hours, you can call the number above right away to inquire about your status. That line is open from 8:00 am - 8:00 p.m., 7 days a week.  Contact tracing saves lives! Together, we have the power to beat this virus and keep our loved ones and neighbors safe.    For more information see CDC link below.      https://www.cdc.gov/coronavirus/2019-ncov/hcp/guidance-prevent-spread.html#precautions        Sources:  [facility], [place] Department [facility] and [facility]           Sincerely,     LLUVIA Pfeiffer

## 2021-12-27 NOTE — FIRST PROVIDER EVALUATION
Emergency Department TeleTriage Encounter Note      CHIEF COMPLAINT    Chief Complaint   Patient presents with    Vaginal Bleeding     Pt dx with covid earlier today, after earlier ed vist pt went home and had dark brown vaginal bleeding, pt is 10 weeks pregnant        VITAL SIGNS   Initial Vitals   BP Pulse Resp Temp SpO2   -- -- -- -- --      MAP       --            ALLERGIES    Review of patient's allergies indicates:  No Known Allergies    PROVIDER TRIAGE NOTE  This is a teletriage evaluation of a 36 y.o. female presenting to the ED complaining of vaginal bleeding. Patient diagnosed with COVID-19 this morning. She reports vaginal bleeding after getting home from the ED earlier. She denies abdominal pain. Ultrasound 2 weeks ago was normal per the patient.     Initial orders will be placed and care will be transferred to an alternate provider when patient is roomed for a full evaluation. Any additional orders and the final disposition will be determined by that provider.           ORDERS  Labs Reviewed   HCG, QUANTITATIVE       ED Orders (720h ago, onward)    Start Ordered     Status Ordering Provider    12/27/21 1712 12/27/21 1711  Airborne and Contact and Droplet Isolation Status  Continuous         Ordered LIANNE, CAITIE G.    12/27/21 1712 12/27/21 1711  hCG, quantitative, pregnancy  STAT         Ordered LIANNE, CAITIE G.            Virtual Visit Note: The provider triage portion of this emergency department evaluation and documentation was performed via [employer], a HIPAA-compliant telemedicine application, in concert with a tele-presenter in the room. A face to face patient evaluation with one of my colleagues will occur once the patient is placed in an emergency department room.      DISCLAIMER: This note was prepared with [employer] voice recognition transcription software. Garbled syntax, mangled pronouns, and other bizarre constructions may be attributed to that software system.

## 2021-12-28 NOTE — ED PROVIDER NOTES
Encounter Date: 12/27/2021       History     Chief Complaint   Patient presents with    Vaginal Bleeding     Pt dx with covid earlier today, after earlier ed vist pt went home and had dark brown vaginal bleeding, pt is 10 weeks pregnant      36-year-old woman who presents for evaluation of scant spotting and bleeding after having been diagnosed with coronavirus earlier today.  She is roughly 10 weeks pregnant is had 2 miscarriages in the past at 10 weeks gestation.  She denies any trauma to the abdomen or pelvis medication use or other complaints at this time.  She was seen by her OB doctor at 8 weeks said he has Basilio and told she had a normal developing pregnancy with a heart rate of 135 beats per minute.  Her blood type is AB-positive.          Review of patient's allergies indicates:  No Known Allergies  No past medical history on file.  No past surgical history on file.  No family history on file.  Social History     Tobacco Use    Smoking status: Never Smoker     Review of Systems  Constitutional-no fever  HEENT-no congestion  Eyes-no redness  Respiratory-no shortness of breath  Cardio-no chest pain  GI-no abdominal pain  Endocrine-no cold intolerance  -positive vaginal bleeding  MSK-no myalgias  Skin-no rashes  Allergy-no environmental allergy  Neurologic-, no headache  Hematology-no swollen nodes  Behavioral-no confusion  Physical Exam     Initial Vitals [12/27/21 1721]   BP Pulse Resp Temp SpO2   120/67 85 16 98.3 °F (36.8 °C) 99 %      MAP       --         Physical Exam  Constitutional: Well appearing, no distress.  Eyes: Conjunctivae normal.  ENT       Head: Normocephalic, atraumatic.       Nose: Normal external appearance        Mouth/Throat: no strigulous respirations   Hematological/Lymphatic/Immunilogical: no visible lymphadenopathy   Cardiovascular: Normal rate,   Respiratory: Normal respiratory effort.   Gastrointestinal: non distended   Musculoskeletal: Normal range of motion in all  extremities. No obvious deformities or swelling.  Neurologic: Alert, oriented. Normal speech and language. No gross focal neurologic deficits are appreciated.  Skin: Skin is warm, dry. No rash noted.  Psychiatric: Mood and affect are normal.   ED Course   Procedures  Labs Reviewed   URINALYSIS, REFLEX TO URINE CULTURE - Abnormal; Notable for the following components:       Result Value    Occult Blood UA 1+ (*)     All other components within normal limits    Narrative:     Specimen Source->Urine   POCT URINE PREGNANCY - Abnormal; Notable for the following components:    POC Preg Test, Ur Positive (*)     All other components within normal limits   HCG, QUANTITATIVE    Narrative:     Release to patient->Immediate   URINALYSIS MICROSCOPIC    Narrative:     Specimen Source->Urine          Imaging Results    None          Medications - No data to display  Medical Decision Making:   Differential Diagnosis:   Threatened miscarriage, miscarriage, missed   Clinical Tests:   Lab Tests: Ordered and Reviewed  Radiological Study: Ordered and Reviewed  ED Management:  Bedside ultrasound did not demonstrate an over fetal cardiac activity.  Will obtain a formal, does have an elevated hCG.  Has A positive blood type.  Patient care signed out to oncoming physician pending ultimate disposition determination.                      Clinical Impression:   Final diagnoses:  [O03.9] Miscarriage  [N93.9] Vaginal bleeding (Primary)                 Roshan Mulligan MD  21 2030

## 2021-12-28 NOTE — PROVIDER PROGRESS NOTES - EMERGENCY DEPT.
Encounter Date: 12/27/2021    ED Physician Progress Notes        Physician Note:   This is an assumption of care note.     Upon shift change, the patient was transferred to me from Dr. Mulligan @ 8 PM in stable condition.     Patient presented to ED with chief complaint of vaginal bleeding    Update:   No acute events during shift.  Plan at shift change was to follow-up on ultrasound results.    Ultrasound demonstrates no fetal cardiac activity and irregularity of the gestational sac.  This is concerning for miscarriage.  Her labs without significant abnormalities.  Previous type and screen was Rh positive.    Patient was informed of results of ultrasound and concern for miscarriage.  She reports light vaginal bleeding.  Discussed pelvic exam with patient however this was declined.  Patient was advised to follow-up closely with her OBGYN at [facility].  She is comfortable this plan.  Discussed return precautions for worsening symptoms, fever, bleeding, or any other concerns.    After taking into careful account the historical factors and physical exam findings of the patient's presentation today, in conjunction with the empirical and objective data obtained on ED workup, no acute emergent medical condition has been identified. The patient appears to be low risk for an emergent medical condition and I feel it is safe and appropriate at this time for the patient to be discharged to follow-up as detailed in their discharge instructions for reevaluation and possible continued outpatient workup and management. I have discussed the specifics of the workup with the patient and the patient has verbalized understanding of the details of the workup, the diagnosis, the treatment plan, and the need for outpatient follow-up.  Although the patient has no emergent etiology today this does not preclude the development of an emergent condition so in addition, I have advised the patient that they can return to the ED and/or activate  EMS at any time with worsening of their symptoms, change of their symptoms, or with any other medical complaint.  The patient remained comfortable and stable during their visit in the ED.  Discharge and follow-up instructions discussed with the patient who expressed understanding and willingness to comply with my recommendations.        IMAGING:  Imaging Results          US OB <14 Wks TransAbd & TransVag, Single Gestation (XPD) (Final result)    Result time 21 22:23:12   Procedure changed from US OB <14 Wks, TransAbd, Single Gestation     Final result by Uriel Connell MD (21 22:23:12)                 Impression:      Single  intrauterine pregnancy with estimated gestational age 9  weeks 2   days. and an DAI of 2022    No fetal heart motion is identified and the gestational sac appears   slightly irregular.  Correlate with beta HCGs in the need for follow-up   ultrasound to evaluate viability..      Electronically signed by: Uriel Connell  Date:    2021  Time:    22:23             Narrative:    EXAMINATION:  US OB <14 WEEKS, TRANSABDOM & TRANSVAG, SINGLE GESTATION (XPD)    CLINICAL HISTORY:  vb; Complete or unspecified spontaneous  without complication    TECHNIQUE:  Transabdominal sonography of the pelvis was performed, followed by   transvaginal sonography to better evaluate the uterus and ovaries.    COMPARISON:  None.    FINDINGS:  Intrauterine gestation(s): Single    Mean gestational sac diameter: 3.8 cm.  Gestational sac appears slightly   irregular.    Yolk sac: None    Crown-rump length (CRL): 2.2 cm    Cardiac activity: Not identified    Subchorionic hemorrhage: None.    Right ovary: Normal.    Left ovary: Normal.    Miscellaneous: No Free Fluid.                                LABS:  Labs Reviewed  URINALYSIS, REFLEX TO URINE CULTURE - Abnormal; Notable for the following components:     Occult Blood UA               1+ (*)              All other components within  normal limits       Narrative: Specimen Source->Urine  POCT URINE PREGNANCY - Abnormal; Notable for the following components:     POC Preg Test, Ur             Positive (*)            All other components within normal limits  HCG, QUANTITATIVE       Narrative: Release to patient->Immediate  URINALYSIS MICROSCOPIC      MEDICATIONS:  Medications - No data to display      IMPRESSION:  Vaginal bleeding  (primary encounter diagnosis)  Miscarriage  Threatened miscarriage       DISCHARGE MEDICATIONS:  New Prescriptions  No medications on file      DISPOSITION:  Discharge

## 2021-12-28 NOTE — ED NOTES
Reviewed discharge instructions, covid precautions, hydration, symptomatic support with OTC medications, and follow up.  Educated pt on UTI and Rx provided by MD.  Pt verbalized understanding.  Pt ambulatory and stable at time of discharge.

## 2021-12-28 NOTE — ED NOTES
loss of taste and smell and nasal congestion; no distress. To be seen and discharged by provider.

## 2021-12-28 NOTE — DISCHARGE INSTRUCTIONS
PLEASE FOLLOW UP WITH YOUR OBGYN AS SOON AS POSSIBLE. PLEASE RETURN IF YOU HAVE WORSENING PAIN, FEVER, BLEEDING OR ANY OTHER CONCERNS.

## 2022-02-22 ENCOUNTER — PATIENT MESSAGE (OUTPATIENT)
Dept: RESEARCH | Facility: HOSPITAL | Age: 37
End: 2022-02-22
Payer: MEDICAID

## 2022-09-21 ENCOUNTER — HOSPITAL ENCOUNTER (EMERGENCY)
Facility: HOSPITAL | Age: 37
Discharge: HOME | End: 2022-09-21
Attending: EMERGENCY MEDICINE
Payer: MEDICAID

## 2022-09-21 VITALS
TEMPERATURE: 99 F | DIASTOLIC BLOOD PRESSURE: 66 MMHG | WEIGHT: 182 LBS | OXYGEN SATURATION: 100 % | HEIGHT: 59 IN | BODY MASS INDEX: 36.69 KG/M2 | HEART RATE: 86 BPM | SYSTOLIC BLOOD PRESSURE: 123 MMHG | RESPIRATION RATE: 19 BRPM

## 2022-09-21 DIAGNOSIS — M25.511 ACUTE PAIN OF RIGHT SHOULDER: Primary | ICD-10-CM

## 2022-09-21 DIAGNOSIS — Z3A.15 15 WEEKS GESTATION OF PREGNANCY: ICD-10-CM

## 2022-09-21 PROCEDURE — 99282 EMERGENCY DEPT VISIT SF MDM: CPT

## 2022-09-21 PROCEDURE — 25000003 PHARM REV CODE 250: Performed by: EMERGENCY MEDICINE

## 2022-09-21 RX ORDER — ACETAMINOPHEN 500 MG
1000 TABLET ORAL
Status: COMPLETED | OUTPATIENT
Start: 2022-09-21 | End: 2022-09-21

## 2022-09-21 RX ADMIN — ACETAMINOPHEN 1000 MG: 500 TABLET ORAL at 06:09

## 2022-09-21 NOTE — ED NOTES
Report received from COLIN Davenport. Assumed  care of pt. Pt denies any relief from pain medication administration

## 2022-09-21 NOTE — ED PROVIDER NOTES
Encounter Date: 9/21/2022    SCRIBE #1 NOTE: I, Koko Ko, am scribing for, and in the presence of,  Fernando Garvey MD. I have scribed the following portions of the note - Other sections scribed: HPI, ROS, PE.     History     Chief Complaint   Patient presents with    Arm Pain     Right arm pain x  6 hours. Denies trauma. Pt 15 weeks and is on bedrest. Arm began hurting while watching TV. Reports mild numbness to fingers. No pain medication at home.     Rhea Sandra is a 37 y.o. female with a PMHx of tendonitis and arthritis who presents to the ED for evaluation of constant right shoulder pain that radiates to his right hand, onset 10 PM yesterday. She denies trauma.Pt's pain is worse with movement. Pt was awake on bed rest when her symptoms began. Pt is on bed rest due to being 15 weeks pregnant with low placenta and a history of miscarriage. Pt is right handed. Pt notes use of Progesterone, baby Aspirin, and prenatal vitamins. Pt denies any recent falls, injuries, or exertion. Pt denies taking any medication for her symptoms. Pt denies chest pain, leg swelling, fever, cough, neck pain, abdominal pain, or pelvic pain.    The history is provided by the patient. No  was used.   Review of patient's allergies indicates:  No Known Allergies  No past medical history on file.  No past surgical history on file.  No family history on file.  Social History     Tobacco Use    Smoking status: Never     Review of Systems   Constitutional:  Negative for fever.   Respiratory:  Negative for cough.    Cardiovascular:  Negative for chest pain and leg swelling.   Gastrointestinal:  Negative for abdominal pain.   Genitourinary:  Negative for pelvic pain.   Musculoskeletal:  Positive for arthralgias and myalgias. Negative for neck pain.   All other systems reviewed and are negative.    Physical Exam     Initial Vitals [09/21/22 0510]   BP Pulse Resp Temp SpO2   133/68 94 20 98 °F (36.7 °C) 98 %      MAP        --         Physical Exam    Nursing note and vitals reviewed.  Constitutional: No distress.   HENT:   Head: Normocephalic and atraumatic.   Left Ear: Hearing normal.   Mouth/Throat: Uvula is midline.   Eyes: Conjunctivae, EOM and lids are normal.   Neck: Trachea normal. Neck supple.   Normal range of motion.  Cardiovascular:  Normal rate, regular rhythm and normal heart sounds.           Palpable right radial pulse.   Pulmonary/Chest: Breath sounds normal.   Abdominal: Abdomen is soft. There is no abdominal tenderness.   Musculoskeletal:      Cervical back: Normal range of motion and neck supple.      Comments: Decreased range of motion to the right shoulder due to pain. Tenderness over the AC joint of the right shoulder. No shoulder deformity. No lower extremity edema.     Lymphadenopathy:     She has no cervical adenopathy.   Neurological: She is alert. GCS eye subscore is 4. GCS verbal subscore is 5. GCS motor subscore is 6.   Skin: Skin is warm and dry.   Psychiatric: She has a normal mood and affect. Her speech is normal and behavior is normal.       ED Course   Procedures  Labs Reviewed - No data to display       Imaging Results    None          Medications   acetaminophen tablet 1,000 mg (1,000 mg Oral Given 9/21/22 0652)     Medical Decision Making:   Initial Assessment:   Rhea Sandra is a 37 y.o. female with a PMHx of tendonitis and arthritis who presents to the ED for evaluation of constant right shoulder pain that radiates to his right hand, onset 10 PM yesterday.  Differential Diagnosis:   Differential Diagnosis includes, but is not limited to:  Shoulder dislocation, fracture, AC separation, compartment syndrome, nerve injury/palsy, impingement syndrome, thoracic outlet syndrome, vascular injury, DVT, rhabdomyolysis, hemarthrosis, septic joint, capsulitis, bursitis, rotator cuff injury, tendonitis, muscle strain, ligament tear/sprain, soft tissue contusion, osteoarthritis.  ED Management:  Since  the patient is on vaginal progesterone, there is the possibility that her shoulder pain may be due to this as arthralgia/myalgia is listed as a common adverse reaction to progesterone.  She has had no trauma to the shoulder.  There is no right upper quadrant tenderness suggestive of her referred pain from gallstones.  I have advised she take Tylenol for pain and follow-up with her obstetrician as soon as able for recheck.  She may of course return to the ED for any possible worsening.        Scribe Attestation:   Scribe #1: I performed the above scribed service and the documentation accurately describes the services I performed. I attest to the accuracy of the note.                   Clinical Impression:   Final diagnoses:  [M25.511] Acute pain of right shoulder (Primary)  [Z3A.15] 15 weeks gestation of pregnancy      ED Disposition Condition    Discharge Stable          ED Prescriptions    None       Follow-up Information       Follow up With Specialties Details Why Contact Info    your OB doctor  Schedule an appointment as soon as possible for a visit             I, Dr. Fernando Garvey, personally performed the services described in this documentation. All medical record entries made by the scribe were at my direction and in my presence. I have reviewed the chart and agree that the record reflects my personal performance and is accurate and complete. Fernando Garvey MD.  7:54 AM 09/21/2022       Fernando Garvey MD  09/21/22 1126

## 2023-09-06 NOTE — ED NOTES
"History of low BPs but the last 4 days or so it has been high.   Four days ago she was getting home BPs of 200/?,  198/?.  Todays BP reading at home was 152/78.  Her BP is always taken by the dialysis nurse and it has been elevated so they told patient to reach out to her PCP to discuss medication changes. Dialysis is 3 times weekly.  Patient has daily HAs. Denies any other symptoms like vision changes, chest pain, difficulty breathing, weakness/ numbness, dizziness.     Routing to PCP to see if patient should do a virtual appointment or be seen for her suddenly elevated BPs.      Reason for Disposition   Patient wants to be seen    Additional Information   Negative: Sounds like a life-threatening emergency to the triager   Negative: Symptom is main concern (e.g., headache, chest pain)   Negative: Low blood pressure is main concern   Negative: Systolic BP >= 160 OR Diastolic >= 100, and any cardiac (e.g., breathing difficulty, chest pain) or neurologic symptoms (e.g., new-onset blurred or double vision)   Negative: Pregnant 20 or more weeks (or postpartum < 6 weeks) with new hand or face swelling   Negative: Pregnant 20 or more weeks (or postpartum < 6 weeks) and Systolic BP >= 160 OR Diastolic >= 110   Negative: Patient sounds very sick or weak to the triager   Negative: Systolic BP >= 200 OR Diastolic >= 120 and having NO cardiac or neurologic symptoms   Negative: Pregnant 20 or more weeks (or postpartum < 6 weeks) with Systolic BP >= 140 OR Diastolic >= 90   Negative: Systolic BP >= 180 OR Diastolic >= 110, and missed most recent dose of blood pressure medication   Negative: Systolic BP >= 180 OR Diastolic >= 110    Answer Assessment - Initial Assessment Questions  1. BLOOD PRESSURE: \"What is the blood pressure?\" \"Did you take at least two measurements 5 minutes apart?\"      152/78   2. ONSET: \"When did you take your blood pressure?\"      This morning, about 4 hours ago  3. HOW: \"How did you take your blood " Pt presents with abcess right axilla   "pressure?\" (e.g., automatic home BP monitor, visiting nurse)      Wrist or dialysis RN takes it before starting the dialysis  4. HISTORY: \"Do you have a history of high blood pressure?\"      No, history of low BP  5. MEDICINES: \"Are you taking any medicines for blood pressure?\" \"Have you missed any doses recently?\"      No medications for HTN as she normally has low BP  6. OTHER SYMPTOMS: \"Do you have any symptoms?\" (e.g., blurred vision, chest pain, difficulty breathing, headache, weakness)      HAs only. Denies chest pain, trouble breathing, vision changes and dizziness  7. PREGNANCY: \"Is there any chance you are pregnant?\" \"When was your last menstrual period?\"      NA    Protocols used: Blood Pressure - High-A-OH      Marnie VALLESN, RN    "

## 2024-01-24 ENCOUNTER — HOSPITAL ENCOUNTER (EMERGENCY)
Facility: HOSPITAL | Age: 39
Discharge: HOME | End: 2024-01-24
Attending: STUDENT IN AN ORGANIZED HEALTH CARE EDUCATION/TRAINING PROGRAM
Payer: MEDICAID

## 2024-01-24 VITALS
TEMPERATURE: 98 F | OXYGEN SATURATION: 99 % | BODY MASS INDEX: 35.08 KG/M2 | RESPIRATION RATE: 18 BRPM | DIASTOLIC BLOOD PRESSURE: 86 MMHG | HEART RATE: 82 BPM | SYSTOLIC BLOOD PRESSURE: 136 MMHG | HEIGHT: 59 IN | WEIGHT: 174 LBS

## 2024-01-24 DIAGNOSIS — M06.9 RHEUMATOID ARTHRITIS FLARE: Primary | ICD-10-CM

## 2024-01-24 DIAGNOSIS — S49.92XA SHOULDER INJURY, LEFT, INITIAL ENCOUNTER: ICD-10-CM

## 2024-01-24 LAB
B-HCG UR QL: NEGATIVE
CTP QC/QA: YES

## 2024-01-24 PROCEDURE — 81025 URINE PREGNANCY TEST: CPT | Performed by: STUDENT IN AN ORGANIZED HEALTH CARE EDUCATION/TRAINING PROGRAM

## 2024-01-24 PROCEDURE — 99284 EMERGENCY DEPT VISIT MOD MDM: CPT

## 2024-01-24 PROCEDURE — 96372 THER/PROPH/DIAG INJ SC/IM: CPT | Performed by: STUDENT IN AN ORGANIZED HEALTH CARE EDUCATION/TRAINING PROGRAM

## 2024-01-24 PROCEDURE — 25000003 PHARM REV CODE 250: Performed by: STUDENT IN AN ORGANIZED HEALTH CARE EDUCATION/TRAINING PROGRAM

## 2024-01-24 PROCEDURE — 63600175 PHARM REV CODE 636 W HCPCS: Performed by: STUDENT IN AN ORGANIZED HEALTH CARE EDUCATION/TRAINING PROGRAM

## 2024-01-24 RX ORDER — KETOROLAC TROMETHAMINE 30 MG/ML
30 INJECTION, SOLUTION INTRAMUSCULAR; INTRAVENOUS
Status: COMPLETED | OUTPATIENT
Start: 2024-01-24 | End: 2024-01-24

## 2024-01-24 RX ORDER — METHOCARBAMOL 750 MG/1
750 TABLET, FILM COATED ORAL
Status: COMPLETED | OUTPATIENT
Start: 2024-01-24 | End: 2024-01-24

## 2024-01-24 RX ORDER — DEXAMETHASONE SODIUM PHOSPHATE 4 MG/ML
10 INJECTION, SOLUTION INTRA-ARTICULAR; INTRALESIONAL; INTRAMUSCULAR; INTRAVENOUS; SOFT TISSUE
Status: COMPLETED | OUTPATIENT
Start: 2024-01-24 | End: 2024-01-24

## 2024-01-24 RX ORDER — OXYCODONE AND ACETAMINOPHEN 5; 325 MG/1; MG/1
1 TABLET ORAL
Status: COMPLETED | OUTPATIENT
Start: 2024-01-24 | End: 2024-01-24

## 2024-01-24 RX ADMIN — DEXAMETHASONE SODIUM PHOSPHATE 10 MG: 4 INJECTION, SOLUTION INTRA-ARTICULAR; INTRALESIONAL; INTRAMUSCULAR; INTRAVENOUS; SOFT TISSUE at 04:01

## 2024-01-24 RX ADMIN — KETOROLAC TROMETHAMINE 30 MG: 30 INJECTION, SOLUTION INTRAMUSCULAR; INTRAVENOUS at 04:01

## 2024-01-24 RX ADMIN — METHOCARBAMOL TABLETS 750 MG: 750 TABLET, COATED ORAL at 03:01

## 2024-01-24 RX ADMIN — OXYCODONE HYDROCHLORIDE AND ACETAMINOPHEN 1 TABLET: 5; 325 TABLET ORAL at 03:01

## 2024-01-24 NOTE — ED PROVIDER NOTES
ED Provider Note - 1/24/2024    History     Chief Complaint   Patient presents with    Shoulder Pain     Patient with hx of RA states her left shoulder has been hurting since yesterday afternoon. She reports that it has worsened to the point that she is unable to move it. She has had this pain before. Ibuprofen and prednisone taken around midnight. Denies trauma or other acute symptoms.        HPI     Rhea Sandra is a 38 y.o. year old female with past medical and surgical history as seen below, presenting with chief complaint of shoulder pain.  Worsening over the past day.  No specific trauma or injury but has been using this arm to lift child seat out of car frequently due to pain in other wrist.  Does have history of Sjogren's and rheumatoid arthritis.  No current rheumatologist due to hers moving out of state.  Has had trouble finding a new 1.    No past medical history on file.  No past surgical history on file.      No family history on file.  Social History     Tobacco Use    Smoking status: Never     Social Determinants of Health with Concerns     Tobacco Use: Unknown (11/2/2021)    Patient History     Smoking Tobacco Use: Never     Smokeless Tobacco Use: Unknown     Passive Exposure: Not on file   Alcohol Use: Not on file   Financial Resource Strain: Not on file   Food Insecurity: Not on file   Transportation Needs: Not on file   Physical Activity: Not on file   Stress: Not on file   Social Connections: Not on file   Housing Stability: Not on file   Depression: Not on file      Review of patient's allergies indicates:  No Known Allergies    Review of Systems     A full Review of Systems (ROS) was performed and was negative unless otherwise stated in the HPI.      Physical Exam     Vitals:    01/24/24 0242 01/24/24 0349   BP: 136/86    BP Location: Left arm    Patient Position: Sitting    Pulse: 82    Resp: 18 18   Temp: 98 °F (36.7 °C)    TempSrc: Oral    SpO2: 99%    Weight: 78.9 kg (174 lb)   "  Height: 4' 11" (1.499 m)         Physical Exam    Nursing note and vitals reviewed.  Constitutional: She appears well-developed and well-nourished.   HENT:   Head: Normocephalic and atraumatic.   Right Ear: External ear normal.   Left Ear: External ear normal.   Nose: Nose normal.   Eyes: Conjunctivae and EOM are normal. Pupils are equal, round, and reactive to light.   Neck: No tracheal deviation present.   Normal range of motion.  Cardiovascular:  Normal rate and regular rhythm.           Pulmonary/Chest: No respiratory distress. She has no wheezes.   Musculoskeletal:      Left shoulder: Tenderness and bony tenderness present. No swelling or deformity. Decreased range of motion.      Cervical back: Normal range of motion.     Neurological: She is alert and oriented to person, place, and time. She has normal strength. No cranial nerve deficit or sensory deficit. Gait normal.   Skin: Skin is warm and dry.   Psychiatric: She has a normal mood and affect. Her behavior is normal. Thought content normal.         Lab Results- Independently reviewed by myself      Labs Reviewed   POCT URINE PREGNANCY           Imaging     Imaging Results              X-Ray Shoulder Trauma Left (Final result)  Result time 01/24/24 08:50:52      Final result by RADIOLOGIST, VIRTUAL (01/24/24 08:50:52)                   Impression:      No acute fracture. Question slight posterior subluxation of humeral head on Y-view although this could be artifactual due to positioning.      Electronically signed by: Carlos Radiologist  Date:    01/24/2024  Time:    08:50               Narrative:    EXAMINATION:  XR Left Shoulder    CLINICAL HISTORY:  Injury or trauma; Fall; Sprain or strain; Shoulder; Left    TECHNIQUE:  Imaging protocol: Radiologic exam of the left shoulder. Views: 2 or more views.    COMPARISON:  No relevant prior studies available.    FINDINGS:  Bones/joints: No definite acute fracture or dislocation; somewhat suboptimal positioning " on the Yview or possibly posterior subluxation of humeral head. Soft tissues: Normal.                                    X-Rays:   Independently Interpreted Readings:   Other Readings:  No acute fractures or dislocations                ED Course         Procedures         Orders Placed This Encounter    ORTHOPEDIC BRACING FOR HOME USE - UPPER EXTREMITY    X-Ray Shoulder Trauma Left    Ambulatory referral/consult to Orthopedics    Ambulatory referral/consult to Rheumatology    Notify Provider if unable to obtain within 30 minutes of assessment    POCT urine pregnancy    dexAMETHasone injection 10 mg    ketorolac injection 30 mg    oxyCODONE-acetaminophen 5-325 mg per tablet 1 tablet    methocarbamoL tablet 750 mg          ED Course as of 01/24/24 2315 Wed Jan 24, 2024   0514 X-Ray Shoulder Trauma Left  Exam: XR Left Shoulder Exam date and time: 1/24/2024 4:03 AM Age: 38 years old Clinical indication: Injury or trauma; Fall; Sprain or strain; Shoulder; Left TECHNIQUE: Imaging protocol: Radiologic exam of the left shoulder. Views: 2 or more views. COMPARISON: No relevant prior studies available. FINDINGS: Bones/joints: No definite acute fracture or dislocation; somewhat suboptimal positioning on the Yview or possibly posterior subluxation of humeral head. Soft tissues: Normal. IMPRESSION: No acute fracture. Question slight posterior subluxation of humeral head on Y-view although this could be artifactual due to positioning.    [KB]   9732 Reassessed patient's shoulder.  She has shown greatly improved range of motion after treatment in the emergency department.  Will be placed in sling for comfort.  Advised to use Tylenol and ibuprofen since patient is still breastfeeding.  Will not use milk for the next 24-36 hours due to medicines given in emergency department.  Referral will be placed for Rheumatology and Orthopedics for follow up.  Return to ED for worsening or changing symptoms. [KB]      ED Course User  Index  [KB] Toney Berman MD              Medical Decision Making       The patient's list of active medical problems, social history, medications, and allergies as documented per RN staff has been reviewed.           Medical Decision Making  Amount and/or Complexity of Data Reviewed  Labs: ordered.     Details: UPT negative  Radiology: ordered and independent interpretation performed. Decision-making details documented in ED Course.    Risk  Prescription drug management.      Medical Decision Making:   Differential Diagnosis:   Strain, contusion, fracture, dislocation, RA flare               Clinical Impression       Follow-up Information       Follow up With Specialties Details Why Contact Info Additional Information    [facility] Orthopedics Schedule an appointment as soon as possible for a visit in 1 week  [address]  [address]  [facility] [address] [phone] Please park in [address] or D and use [address]. Take [address] elevators.    [facility] Rheumatology Schedule an appointment as soon as possible for a visit in 1 week  [address] [facility] [address] [phone] Please park in surface lot and check in at the .    [facility] Emergency Medicine Go to  As needed, If symptoms worsen [address] [address] [phone]             Referrals:  Orders Placed This Encounter   Procedures    Ambulatory referral/consult to Orthopedics     Standing Status:   Future     Standing Expiration Date:   2/24/2025     Referral Priority:   Routine     Referral Type:   Consultation     Requested Specialty:   Orthopedic Surgery     Number of Visits Requested:   1    Ambulatory referral/consult to Rheumatology     Standing Status:   Future     Standing Expiration Date:   2/24/2025     Referral Priority:   Routine     Referral Type:   Consultation     Referral Reason:   Specialty Services Required      Requested Specialty:   Rheumatology     Number of Visits Requested:   1       Disposition   ED Disposition Condition    Discharge Stable            Diagnosis    ICD-10-CM ICD-9-CM   1. Rheumatoid arthritis flare  M06.9 714.0   2. Shoulder injury, left, initial encounter  S49.92XA 959.2           Toney Berman MD        01/24/2024          DISCLAIMER: This note was prepared with [employer] voice recognition transcription software. Garbled syntax, mangled pronouns, and other bizarre constructions may be attributed to that software system.       Toney Berman MD  01/24/24 3513

## 2024-01-24 NOTE — ED NOTES
Adult Physical Assessment  LOC: Rhea Sandra, 38 y.o. female verified via two identifiers.  The patient is awake, alert, oriented and speaking appropriately at this time.  APPEARANCE: Patient resting comfortably and appears to be in no acute distress at this time. Patient is clean and well groomed, patient's clothing is properly fastened.  SKIN:The skin is warm and dry, color consistent with ethnicity, patient has normal skin turgor and moist mucus membranes, skin intact, no breakdown or brusing noted.  MUSCULOSKELETAL: Patient moves lower extremities and right upper extremity with good rom, no obvious swelling or deformities noted. Patient has limited rom to left upper extremity due to pain. Denies numbness or tingling to the left arm/hand.   RESPIRATORY: Airway is open and patent, respirations are spontaneous, patient has a normal effort and rate, no accessory muscle use noted.  CARDIAC: Patient has a normal rate and rhythm, no periphreal edema noted in any extremity, capillary refill < 3 seconds in all extremities  ABDOMEN: Soft and non tender to palpation, no abdominal distention noted. Bowel sounds present in all four quadrants.  NEUROLOGIC: Eyes open spontaneously, behavior appropriate to situation, follows commands, facial expression symmetrical, bilateral hand grasp equal and even, purposeful motor response noted, normal sensation in all extremities when touched with a finger.

## 2024-01-24 NOTE — Clinical Note
"Rhea Castillo" Jocy was seen and treated in our emergency department on 1/24/2024.  She may return to work on 01/26/2024.       If you have any questions or concerns, please don't hesitate to call.      Toney Berman MD"

## 2024-01-24 NOTE — ED NOTES
Patient reported to the ed this morning due to left shoulder/upper arm pain that she states is related to her RA. She states the pain started yesterday afternoon.

## 2024-02-26 ENCOUNTER — HOSPITAL ENCOUNTER (EMERGENCY)
Facility: HOSPITAL | Age: 39
Discharge: HOME | End: 2024-02-26
Attending: EMERGENCY MEDICINE
Payer: MEDICAID

## 2024-02-26 VITALS
DIASTOLIC BLOOD PRESSURE: 77 MMHG | TEMPERATURE: 98 F | WEIGHT: 176 LBS | HEART RATE: 81 BPM | BODY MASS INDEX: 35.55 KG/M2 | OXYGEN SATURATION: 98 % | RESPIRATION RATE: 18 BRPM | SYSTOLIC BLOOD PRESSURE: 153 MMHG

## 2024-02-26 DIAGNOSIS — Z87.39 HISTORY OF RHEUMATOID ARTHRITIS: ICD-10-CM

## 2024-02-26 DIAGNOSIS — M25.579 ANKLE PAIN: ICD-10-CM

## 2024-02-26 DIAGNOSIS — M25.571 ACUTE RIGHT ANKLE PAIN: Primary | ICD-10-CM

## 2024-02-26 PROCEDURE — 99284 EMERGENCY DEPT VISIT MOD MDM: CPT | Mod: 25

## 2024-02-26 PROCEDURE — 63600175 PHARM REV CODE 636 W HCPCS: Performed by: PHYSICIAN ASSISTANT

## 2024-02-26 PROCEDURE — 96372 THER/PROPH/DIAG INJ SC/IM: CPT | Performed by: PHYSICIAN ASSISTANT

## 2024-02-26 RX ORDER — PREDNISONE 20 MG/1
40 TABLET ORAL DAILY
Qty: 10 TABLET | Refills: 0 | Status: SHIPPED | OUTPATIENT
Start: 2024-02-26 | End: 2024-03-02

## 2024-02-26 RX ORDER — DEXAMETHASONE SODIUM PHOSPHATE 4 MG/ML
10 INJECTION, SOLUTION INTRA-ARTICULAR; INTRALESIONAL; INTRAMUSCULAR; INTRAVENOUS; SOFT TISSUE
Status: COMPLETED | OUTPATIENT
Start: 2024-02-26 | End: 2024-02-26

## 2024-02-26 RX ADMIN — DEXAMETHASONE SODIUM PHOSPHATE 10 MG: 4 INJECTION, SOLUTION INTRA-ARTICULAR; INTRALESIONAL; INTRAMUSCULAR; INTRAVENOUS; SOFT TISSUE at 06:02

## 2024-02-26 NOTE — Clinical Note
"Rhea Castillo" Jocy was seen and treated in our emergency department on 2/26/2024.  She may return to work on 02/28/2024.       If you have any questions or concerns, please don't hesitate to call.      Mary Lao PA-C"

## 2024-02-26 NOTE — ED TRIAGE NOTES
PT arrived with c/o R ankle swelling and pain since Saturday.  Pt reports pain extends to R heel and toes.  Denies any recent falls, trauma, or heavy lifting. Pt reports new dx of rheumatoid arthritis.  Pt took 2 doses of steroids that her doctor rx'd for her RA, also took ibuprofen with no relief.  Pt answering questions appropriately, speaking in complete sentences, respirations even and unlabored.  Aao x 4.

## 2024-02-27 NOTE — DISCHARGE INSTRUCTIONS
Ice, elevate, rest.  You do have some arthritic changes on the x-ray.  Suspect that your pain is due to years rheumatoid arthritis.  Take prednisone as prescribed.  You will need to have close follow-up with your new rheumatologist.  Return to the ER with concerning or worsening symptoms.

## 2024-02-27 NOTE — ED PROVIDER NOTES
Encounter Date: 2/26/2024       History     Chief Complaint   Patient presents with    Ankle Pain     Pt has pain and swelling to right ankle. Pt denies any injury or trauma. Pt has RA but usually her pain is bilateral.      38-year-old female with a history of Sjogren's disease, rheumatoid arthritis, polyarthritis  presents ER for evaluation of right ankle pain that she noticed on Saturday.  She states that since then she has had pain to the right ankle and heel region.  She denies any injury or trauma.  No redness.  She has noticed some minimal swelling.  Patient does have a history of rheumatoid arthritis which has affected her shoulder in the past.  She states she is currently on IM Cimzia every 2 weeks.     The history is provided by the patient.     Review of patient's allergies indicates:  No Known Allergies  Past Medical History:   Diagnosis Date    Rheumatoid arthritis, unspecified     Sjogren's disease      Past Surgical History:   Procedure Laterality Date    DILATION AND CURETTAGE OF UTERUS       History reviewed. No pertinent family history.  Social History     Tobacco Use    Smoking status: Never     Review of Systems   Constitutional:  Negative for chills and fever.   HENT:  Negative for congestion.    Eyes:  Negative for visual disturbance.   Respiratory:  Negative for shortness of breath.    Cardiovascular:  Negative for chest pain.   Gastrointestinal:  Negative for nausea and vomiting.   Genitourinary:  Negative for dysuria and flank pain.   Musculoskeletal:  Positive for arthralgias and joint swelling. Negative for myalgias.   Skin:  Negative for rash.   Allergic/Immunologic: Negative for immunocompromised state.   Neurological:  Negative for weakness and numbness.   Hematological:  Does not bruise/bleed easily.   Psychiatric/Behavioral:  Negative for confusion.        Physical Exam     Initial Vitals [02/26/24 1708]   BP Pulse Resp Temp SpO2   (!) 153/77 81 18 97.6 °F (36.4 °C) 98 %      MAP        --         Physical Exam    Vitals reviewed.  Constitutional: She appears well-developed and well-nourished. She is not diaphoretic. No distress.   HENT:   Head: Normocephalic and atraumatic.   Eyes: Conjunctivae and EOM are normal.   Neck: Neck supple.   Cardiovascular:  Intact distal pulses.           Pulmonary/Chest: No respiratory distress.   Musculoskeletal:         General: Normal range of motion.      Cervical back: Neck supple.      Right ankle: Swelling present. Tenderness present over the lateral malleolus.     Neurological: She is alert and oriented to person, place, and time. She has normal strength.   Skin: Skin is warm.         ED Course   Procedures  Labs Reviewed   POCT URINE PREGNANCY          Imaging Results              X-Ray Ankle Complete Right (Final result)  Result time 02/26/24 18:09:30      Final result by Renato Tovar DO (02/26/24 18:09:30)                   Impression:      No acute fracture or dislocation.    Slight lucency at the medial aspect of the talar dome, concerning for a developing osteochondral defect.      Electronically signed by: Renato Tovar  Date:    02/26/2024  Time:    18:09               Narrative:    EXAMINATION:  XR ANKLE COMPLETE 3 VIEW RIGHT    CLINICAL HISTORY:  Pain in unspecified ankle and joints of unspecified foot    TECHNIQUE:  AP, lateral, and oblique images of the right ankle were performed.    COMPARISON:  None    FINDINGS:  There is a slight lucency at the medial aspect of the talar dome, concerning for a developing osteochondral defect.  No acute fracture or dislocation. Alignment is normal. The ankle mortise is congruent. The talar dome is intact. Joint spaces are preserved. There is a joint effusion.  There is soft tissue edema.                                       Medications   dexAMETHasone injection 10 mg (10 mg Intramuscular Given 2/26/24 1812)     Medical Decision Making  Arthritis, sprain, strain, fracture    Amount and/or Complexity of  Data Reviewed  Labs: ordered.    Risk  Prescription drug management.         APC / Resident Notes:   Patient seen in the ER promptly upon arrival.  She is afebrile, no acute distress.  Physical examination reveals some mild swelling and tenderness on palpation to the lateral aspect of the right ankle.  Distal pulses intact and equal bilaterally.  Range motion of the ankle is intact.  There is no erythema or warmth to touch.    Less concerning for infectious etiology.         ED Course as of 02/26/24 1829 Mon Feb 26, 2024 1820 X-Ray Ankle Complete Right  mpression:     No acute fracture or dislocation.     Slight lucency at the medial aspect of the talar dome, concerning for a developing osteochondral defect.   [AJ]   1825 Suspect symptoms secondary to arthritic changes likely secondary to rheumatoid arthritis.  Patient did not want narcotic pain medication to go home with.  Will prescribe patient home on prednisone to use as directed.  Will advise close follow-up with her new rheumatologist.  Advised to ice, elevate and rest. [AJ]   1826 Will provide Aircast splint for comfort and support.  Offered crutches but she did not want.  Will advise to return to the emergency room immediately if symptoms persist or worsen.  She may take Tylenol in addition to the prednisone prescribed.  She is otherwise stable for discharge and close follow-up. [AJ]      ED Course User Index  [AJ] Mary Lao PA-C                           Clinical Impression:  Final diagnoses:  [M25.579] Ankle pain  [M25.571] Acute right ankle pain (Primary)  [Z87.39] History of rheumatoid arthritis          ED Disposition Condition    Discharge Stable          ED Prescriptions       Medication Sig Dispense Start Date End Date Auth. Provider    predniSONE (DELTASONE) 20 MG tablet Take 2 tablets (40 mg total) by mouth once daily. for 5 days 10 tablet 2/26/2024 3/2/2024 Mary Lao PA-C          Follow-up Information    None           Mary Lao PA-C  02/26/24 1823

## 2024-04-08 ENCOUNTER — HOSPITAL ENCOUNTER (EMERGENCY)
Facility: HOSPITAL | Age: 39
Discharge: HOME | End: 2024-04-08
Attending: EMERGENCY MEDICINE
Payer: MEDICAID

## 2024-04-08 VITALS
OXYGEN SATURATION: 100 % | RESPIRATION RATE: 18 BRPM | SYSTOLIC BLOOD PRESSURE: 179 MMHG | BODY MASS INDEX: 34.34 KG/M2 | HEART RATE: 91 BPM | WEIGHT: 170 LBS | DIASTOLIC BLOOD PRESSURE: 98 MMHG | TEMPERATURE: 98 F

## 2024-04-08 DIAGNOSIS — M06.9 RHEUMATOID ARTHRITIS INVOLVING RIGHT SHOULDER, UNSPECIFIED WHETHER RHEUMATOID FACTOR PRESENT: ICD-10-CM

## 2024-04-08 DIAGNOSIS — M06.9 RHEUMATOID ARTHRITIS FLARE: Primary | ICD-10-CM

## 2024-04-08 PROCEDURE — 63600175 PHARM REV CODE 636 W HCPCS

## 2024-04-08 PROCEDURE — 96372 THER/PROPH/DIAG INJ SC/IM: CPT

## 2024-04-08 PROCEDURE — 99284 EMERGENCY DEPT VISIT MOD MDM: CPT | Mod: 25

## 2024-04-08 RX ORDER — NAPROXEN 500 MG/1
500 TABLET ORAL 2 TIMES DAILY
Qty: 14 TABLET | Refills: 0 | Status: SHIPPED | OUTPATIENT
Start: 2024-04-08 | End: 2024-04-15

## 2024-04-08 RX ORDER — DEXAMETHASONE SODIUM PHOSPHATE 4 MG/ML
10 INJECTION, SOLUTION INTRA-ARTICULAR; INTRALESIONAL; INTRAMUSCULAR; INTRAVENOUS; SOFT TISSUE
Status: COMPLETED | OUTPATIENT
Start: 2024-04-08 | End: 2024-04-08

## 2024-04-08 RX ORDER — KETOROLAC TROMETHAMINE 30 MG/ML
30 INJECTION, SOLUTION INTRAMUSCULAR; INTRAVENOUS
Status: COMPLETED | OUTPATIENT
Start: 2024-04-08 | End: 2024-04-08

## 2024-04-08 RX ORDER — PREDNISONE 20 MG/1
40 TABLET ORAL DAILY
Qty: 10 TABLET | Refills: 0 | Status: SHIPPED | OUTPATIENT
Start: 2024-04-08 | End: 2024-04-13

## 2024-04-08 RX ADMIN — DEXAMETHASONE SODIUM PHOSPHATE 10 MG: 4 INJECTION, SOLUTION INTRA-ARTICULAR; INTRALESIONAL; INTRAMUSCULAR; INTRAVENOUS; SOFT TISSUE at 08:04

## 2024-04-08 RX ADMIN — KETOROLAC TROMETHAMINE 30 MG: 30 INJECTION, SOLUTION INTRAMUSCULAR at 08:04

## 2024-04-08 NOTE — Clinical Note
"Rhea Castillo" Jocy was seen and treated in our emergency department on 4/8/2024.  She may return to work on 04/10/2024.       If you have any questions or concerns, please don't hesitate to call.      Marcia Villarreal PA-C"

## 2024-04-08 NOTE — ED TRIAGE NOTES
Pt presents to ED today c/o LUE pain secondary to RA states she was previously taking an injection for RA   Reports she has an appt with a new rheumatologist next month, however they will not refill her medication until she establishes care   NAD noted

## 2024-04-09 ENCOUNTER — PATIENT MESSAGE (OUTPATIENT)
Dept: RHEUMATOLOGY | Facility: CLINIC | Age: 39
End: 2024-04-09
Payer: MEDICAID

## 2024-04-09 NOTE — ED PROVIDER NOTES
Encounter Date: 4/8/2024       History     Chief Complaint   Patient presents with    Arm Pain     Pt states she has hx of RA, and out of medications x 2 mths, and unable to get refill, until apt in May     Patient is a 39-year-old female with a past medical history of rheumatoid arthritis and Sjogren's disease who presents to emergency room for left shoulder, elbow, and wrist pain that onset a few days ago.  Patient states that she has an extensive history of rheumatoid arthritis.  She usually gets her flare-ups in her left shoulder or right ankle.  She has had multiple emergency room visits in the past with short-term relief.  She was seeing a rheumatologist a few months ago and was on a subcutaneous medication.  However, her rheumatologist left and she has not been able to have follow up.  Has appointment scheduled on 05/21/2024.  Patient states she was she is attempted multiple avenues of relief including diet changes, Tylenol, intermittent ibuprofen, and lifestyle changes.  Denies injury or inciting event causing this flare-up.  Also denies weakness, numbness, tingling, or others at this time.    The history is provided by the patient. No  was used.     Review of patient's allergies indicates:  No Known Allergies  Past Medical History:   Diagnosis Date    Rheumatoid arthritis, unspecified     Sjogren's disease      Past Surgical History:   Procedure Laterality Date    DILATION AND CURETTAGE OF UTERUS       No family history on file.  Social History     Tobacco Use    Smoking status: Never     Review of Systems   Constitutional:  Negative for chills, diaphoresis, fatigue and fever.   Musculoskeletal:  Positive for arthralgias (left shoulder, elbow, wrist). Negative for joint swelling and myalgias.   Skin:  Negative for color change, rash and wound.   Neurological:  Negative for weakness and numbness.       Physical Exam     Initial Vitals [04/08/24 1839]   BP Pulse Resp Temp SpO2   (!) 179/98  91 18 98.1 °F (36.7 °C) 100 %      MAP       --         Physical Exam    Nursing note and vitals reviewed.  Constitutional: She appears well-developed and well-nourished. She is not diaphoretic. No distress.   HENT:   Head: Normocephalic and atraumatic.   Right Ear: External ear normal.   Left Ear: External ear normal.   Eyes: Conjunctivae and EOM are normal. Pupils are equal, round, and reactive to light.   Neck: Neck supple.   Normal range of motion.  Pulmonary/Chest: No respiratory distress.   Musculoskeletal:      Left shoulder: Tenderness present.      Left elbow: Tenderness present.      Left wrist: Tenderness present.        Arms:       Cervical back: Normal range of motion and neck supple.      Comments: Diffuse tenderness to left upper extremity.  Sensation intact.  Radial pulses 2+.  Limited range of motion due to pain.  However, patient is able to have adequate passive and active range of motion.  No overlying skin changes.     Neurological: She is alert and oriented to person, place, and time. She has normal strength.   Skin: Skin is warm.   Psychiatric: She has a normal mood and affect. Her behavior is normal. Thought content normal.         ED Course   Procedures  Labs Reviewed - No data to display       Imaging Results    None          Medications   dexAMETHasone injection 10 mg (has no administration in time range)   ketorolac injection 30 mg (has no administration in time range)     Medical Decision Making  Patient presents to emergency room for left upper extremity pain.  Vital signs stable.  Physical exam as stated above.    Differential Diagnosis includes, but is not limited to fracture, dislocation, nerve injury/palsy, vascular injury, DVT, septic joint, cellulitis, bursitis, muscle strain, ligament tear/sprain, laceration, foreign body, abrasion, soft tissue contusion, osteoarthritis, or gout.  I do not suspect nerve or vascular injury, as sensation and pulses intact.  No significant extremity  edema that would suggest DVT.  Patient with adequate range of motion.  Unlikely septic joint.  No evidence of laceration or abrasion on physical exam.  Patient has extensive history of rheumatoid arthritis with similar flare-ups.  Last seen about a month ago.  States that this feels similar to such.  Will give patient Decadron shot and Toradol in the emergency room today.  Will prescribe short course of prednisone and naproxen to take upon discharge.  Advised on risks and benefits of taking NSAIDs during breastfeeding.  However, courses short term with low risk for complications with baby.     I see no indication of an emergent process beyond that addressed during our encounter. Patient stable for discharge at this time. I have counseled the patient regarding follow up with rheumatologist and gave strict return precautions. I have discussed the final diagnosis and gave instructions regarding prescribed medications. Patient verbalized understanding and is agreeable.     Problems Addressed:  Rheumatoid arthritis flare: acute illness or injury  Rheumatoid arthritis involving right shoulder, unspecified whether rheumatoid factor present: chronic illness or injury with exacerbation, progression, or side effects of treatment    Amount and/or Complexity of Data Reviewed  External Data Reviewed: notes.     Details: Patient has been seen multiple times over the past few months in the ER for rheumatoid arthritis pain.  She was last seen in 02/2024.  At that time she has given a Decadron shot and prescribe prednisone.  She was also seen by primary care doctor in 03/2024.   Radiology:      Details: Considered x-ray of left upper extremity.  However, this is a chronic problem.  No recent injury or inciting event.  I do not suspect imaging to be remarkably different from previous done a month ago.    Risk  OTC drugs.  Prescription drug management.  Risk Details: Comorbidities taken into consideration during the patient's  evaluation and treatment include rheumatoid arthritis and Sjogren's disease.    Social determinants of health taken into consideration during development of our treatment plan include difficulty in obtaining follow-up and obtaining medications; health literacy.                                      Clinical Impression:  Final diagnoses:  [M06.9] Rheumatoid arthritis flare (Primary)  [M06.9] Rheumatoid arthritis involving right shoulder, unspecified whether rheumatoid factor present          ED Disposition Condition    Discharge Stable          ED Prescriptions       Medication Sig Dispense Start Date End Date Auth. Provider    predniSONE (DELTASONE) 20 MG tablet Take 2 tablets (40 mg total) by mouth once daily. for 5 days 10 tablet 4/8/2024 4/13/2024 Marcia Villarreal PA-C    naproxen (NAPROSYN) 500 MG tablet Take 1 tablet (500 mg total) by mouth 2 (two) times daily. for 7 days 14 tablet 4/8/2024 4/15/2024 Marcia Villarrael PA-C          Follow-up Information       Follow up With Specialties Details Why Contact Info    Your doctor  Schedule an appointment as soon as possible for a visit                Marcia Villarreal PA-C  04/08/24 2026

## 2024-04-09 NOTE — DISCHARGE INSTRUCTIONS
Thank you for allowing me and my emergency team to take care of you here today! I hope you feel better soon. Please do not hesitate to return with any additional concerns that may arise from this or any new problem you encounter.    Our goal in the emergency department is to always give you outstanding care and exceptional service. If you receive a survey by mail or e-mail in the next week regarding your experience in our ED, we would greatly appreciate you completing it. Your feedback provides us with a way to recognize our staff who give very good care and it helps us learn how to improve when your experience was below the excellence we aspire to be!    Brook Juneau, PA-C [facility]   Emergency Room Physician Assistant

## 2024-04-18 ENCOUNTER — OFFICE VISIT (OUTPATIENT)
Dept: RHEUMATOLOGY | Facility: CLINIC | Age: 39
End: 2024-04-18
Payer: MEDICAID

## 2024-04-18 VITALS
SYSTOLIC BLOOD PRESSURE: 136 MMHG | HEART RATE: 88 BPM | DIASTOLIC BLOOD PRESSURE: 84 MMHG | BODY MASS INDEX: 37.94 KG/M2 | HEIGHT: 59 IN | WEIGHT: 188.19 LBS

## 2024-04-18 DIAGNOSIS — M06.9 RHEUMATOID ARTHRITIS FLARE: ICD-10-CM

## 2024-04-18 DIAGNOSIS — E55.9 VITAMIN D DEFICIENCY: ICD-10-CM

## 2024-04-18 DIAGNOSIS — M05.79 RHEUMATOID ARTHRITIS INVOLVING MULTIPLE SITES WITH POSITIVE RHEUMATOID FACTOR: Primary | ICD-10-CM

## 2024-04-18 DIAGNOSIS — M35.00 SJOGREN'S SYNDROME, WITH UNSPECIFIED ORGAN INVOLVEMENT: ICD-10-CM

## 2024-04-18 PROCEDURE — 99205 OFFICE O/P NEW HI 60 MIN: CPT | Mod: S$PBB,,, | Performed by: STUDENT IN AN ORGANIZED HEALTH CARE EDUCATION/TRAINING PROGRAM

## 2024-04-18 PROCEDURE — 99999 PR PBB SHADOW E&M-EST. PATIENT-LVL IV: CPT | Mod: PBBFAC,,, | Performed by: STUDENT IN AN ORGANIZED HEALTH CARE EDUCATION/TRAINING PROGRAM

## 2024-04-18 PROCEDURE — 3079F DIAST BP 80-89 MM HG: CPT | Mod: CPTII,,, | Performed by: STUDENT IN AN ORGANIZED HEALTH CARE EDUCATION/TRAINING PROGRAM

## 2024-04-18 PROCEDURE — 1159F MED LIST DOCD IN RCRD: CPT | Mod: CPTII,,, | Performed by: STUDENT IN AN ORGANIZED HEALTH CARE EDUCATION/TRAINING PROGRAM

## 2024-04-18 PROCEDURE — 3075F SYST BP GE 130 - 139MM HG: CPT | Mod: CPTII,,, | Performed by: STUDENT IN AN ORGANIZED HEALTH CARE EDUCATION/TRAINING PROGRAM

## 2024-04-18 PROCEDURE — 3008F BODY MASS INDEX DOCD: CPT | Mod: CPTII,,, | Performed by: STUDENT IN AN ORGANIZED HEALTH CARE EDUCATION/TRAINING PROGRAM

## 2024-04-18 PROCEDURE — 99214 OFFICE O/P EST MOD 30 MIN: CPT | Mod: PBBFAC,PN | Performed by: STUDENT IN AN ORGANIZED HEALTH CARE EDUCATION/TRAINING PROGRAM

## 2024-04-18 RX ORDER — HYDROXYCHLOROQUINE SULFATE 200 MG/1
400 TABLET, FILM COATED ORAL DAILY
Qty: 60 TABLET | Refills: 11 | Status: SHIPPED | OUTPATIENT
Start: 2024-04-18 | End: 2025-04-18

## 2024-04-18 RX ORDER — HYDROXYCHLOROQUINE SULFATE 200 MG/1
2 TABLET, FILM COATED ORAL DAILY
COMMUNITY
Start: 2023-07-25 | End: 2024-04-18 | Stop reason: SDUPTHER

## 2024-04-18 RX ORDER — NIFEDIPINE 30 MG/1
1 TABLET, EXTENDED RELEASE ORAL DAILY
COMMUNITY
Start: 2024-03-26 | End: 2024-05-16

## 2024-04-18 RX ORDER — PREDNISONE 5 MG/1
TABLET ORAL
Qty: 63 TABLET | Refills: 0 | Status: SHIPPED | OUTPATIENT
Start: 2024-04-18 | End: 2024-05-30

## 2024-04-18 NOTE — PROGRESS NOTES
RHEUMATOLOGY OUTPATIENT CLINIC NOTE    4/18/2024    Attending Rheumatologist: Oriana Barber  Primary Care Provider: Lupe, Primary Doctor   Physician Requesting Consultation: Toney Berman MD  [address]  [address]  [place] [address]  Chief Complaint/Reason For Consultation:  Joint Pain (Right wrist pain)      Subjective:       HPI  Rhea Sandra is a 39 y.o. Black or  female who comes for evaluation of RA.     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 Then delivered her child in Feb 2023 and a couple of weeks later, she developed feet, hand, wrist, shoulder pain. Also had pain in left hip. Unable to make a fist. She was evaluated by Dr. Craig last year. Started on  mg BID and Cimzia 200 mg every 2 weeks (she is breastfeeding). This combination of medications helped significantly with her joint pain and swelling. She ran out of medications about 4 months ago. She has been in the emergency room 3 times with flares of RA which improve with steroid.   She is still breastfeeding now.     She was also taking vitamin D once a week.     Labs  7/2023  QuantiFERON negative  Hepatitis B and C serologies negative  HIV negative    6/2023  GABO negative    RF positive  SSA, SSB positive   ACE normal  IL 2 receptor negative  Anticardiolipin, beta 2 glycoprotein, lupus anticoagulant negative  CRP 15.7  ESR 34  CMP with normal kidney and liver function  CBC hemoglobin 11.5, WBC 4.2, platelets 303  Vitamin D 27     Review of Systems   Constitutional:  Negative for fever and unexpected weight change.   HENT:  Negative for mouth sores and trouble swallowing.    Eyes:  Negative for redness.   Respiratory:  Negative for cough and shortness of breath.    Cardiovascular:  Negative for chest pain.   Gastrointestinal:  Positive for diarrhea. Negative for constipation.   Genitourinary:  Negative for dysuria and genital sores.   Integumentary:   Negative for rash.   Neurological:  Negative for headaches.   Hematological:  Does not bruise/bleed easily.        Chronic comorbid conditions affecting medical decision making today:  Past Medical History:   Diagnosis Date    Rheumatoid arthritis, unspecified     Sjogren's disease      Past Surgical History:   Procedure Laterality Date    DILATION AND CURETTAGE OF UTERUS       No family history on file.  Social History     Substance and Sexual Activity   Alcohol Use Yes    Comment: occassionally     Social History     Tobacco Use   Smoking Status Never   Smokeless Tobacco Not on file     Social History     Substance and Sexual Activity   Drug Use Not on file       Current Outpatient Medications:     ibuprofen (ADVIL,MOTRIN) 600 MG tablet, Take 1 tablet (600 mg total) by mouth every 6 (six) hours as needed., Disp: 20 tablet, Rfl: 0    NIFEdipine (PROCARDIA-XL) 30 MG (OSM) 24 hr tablet, Take 1 tablet by mouth once daily., Disp: , Rfl:     prenatal vit,rafi 74-iron-folic 27 mg iron- 1 mg Tab, Take by mouth., Disp: , Rfl:     hydroxychloroquine (PLAQUENIL) 200 mg tablet, Take 2 tablets (400 mg total) by mouth once daily., Disp: 60 tablet, Rfl: 11    predniSONE (DELTASONE) 5 MG tablet, Take 3 tablets (15 mg total) by mouth once daily for 7 days, THEN 2 tablets (10 mg total) once daily for 7 days, THEN 1 tablet (5 mg total) once daily., Disp: 63 tablet, Rfl: 0     Objective:         Vitals:    04/18/24 1349   BP: 136/84   Pulse: 88     Physical Exam   Constitutional: She is oriented to person, place, and time. She appears well-developed.   HENT:   Head: Normocephalic.   Mouth/Throat: Mucous membranes are moist.   Pulmonary/Chest: Effort normal.   Abdominal: Soft.   Musculoskeletal:      Cervical back: Neck supple.      Comments: Cspine FROM no tenderness  Tspine FROM no tenderness  Lspine FROM no tenderness.  Shoulders: FROM; no synovitis;  Elbows: FROM; no synovitis; no tophi. Right elbow nodule.   Wrists: right wrist  "with synovitis.   MCPs: FROM; no synovitis, tenderness in multiple MCPs  PIPs:FROM; no synovitis;   DIPs: FROM; no synovitis;   HIPS: FROM  Knees: FROM; no synovitis; no instability  Ankles: right ankle with swelling and tenderness.   Toes: no tenderness on palpation.     Lymphadenopathy:     She has no cervical adenopathy.   Neurological: She is alert and oriented to person, place, and time.   Skin: No rash noted.   Vitals reviewed.    CDAI Score: 21.5 / 76         Reviewed old and all outside pertinent medical records available.    All lab results personally reviewed and interpreted by me.  Lab Results   Component Value Date    WBC 13.0 (H) 02/28/2023    HGB 11.9 (L) 02/28/2023    HCT 35.3 (L) 02/28/2023    MCV 84.5 02/28/2023    MCH 28.5 02/28/2023    MCHC 33.7 02/28/2023    RDW 19.0 (H) 02/28/2023     09/30/2019    MPV 11.8 (H) 02/28/2023    PLTEST Adequate 05/01/2009       Lab Results   Component Value Date     09/30/2019    K 4.0 09/30/2019     09/30/2019    CO2 18 (L) 09/30/2019    GLU 88 09/30/2019    BUN 9 09/30/2019    CALCIUM 9.1 09/30/2019    PROT 8.1 09/30/2019    ALBUMIN 3.8 09/30/2019    BILITOT 0.4 09/30/2019    AST 21 09/30/2019    ALKPHOS 48 (L) 09/30/2019    ALT 14 09/30/2019       Lab Results   Component Value Date    COLORU Yellow 02/27/2023    APPEARANCEUA Clear 12/27/2021    SPECGRAV 1.010 12/27/2021    PHUR 6.0 12/27/2021    PROTEINUA Negative 12/27/2021    KETONESU Negative 12/27/2021    LEUKOCYTESUR Negative 02/27/2023    NITRITE Negative 12/27/2021    UROBILINOGEN 0.2 02/27/2023       No results found for: "CRP"    No results found for: "GABO", "RF", "SEDRATE", "CCPANTIBODIE"    No components found for: "25OHVITDTOT", "18AQFGRL0", "25ELADLI6", "METHODNOTE"    No results found for: "URICACID"    No results found for: "QUANTIFERON", "HEPBCOREIGG", "HEPBSAB", "HEPBSAG", "HEPCAB"    Imaging:  All imaging reviewed and independently interpreted by me.     ASSESSMENT / PLAN: "     Rhea Sandra is a 39 y.o. Black or  female with:    Seropositive, nodular rheumatoid arthritis ACTIVE  -RF + / CCP +   -right elbow rheumatoid nodule  -moderate disease activity CDAI 21.5   -her RA was controlled on cimzia every 2 weeks and  m g daily but ran out.   -resume  mg BID. Discussed potential adverse effects including retinal toxicity, prolonged QT, nausea, cytopenias. isk of retinal toxicity increased with concomitant use of tamoxifen, doses > 5 mg/kg/day and prolonged use > 5 years. Baseline eye exam within 6 months of initiation then annually starting after 5 years of treatment. Baseline CBC and CMP done today. Will follow CBC and CMP at 3 months then q 6 months    -resume cimzia 200 mg SC every 2 weeks once labs are back. This is TNFi of preference as she is still breasfeeding.   Discussed with patient initiation of cimzia . Risks, benefits, indication and alternatives discussed with patient including but not limited to: serious side effects such as lowering your ability to fight infections such as those infections caused by viruses, fungi, bacteria with special concern for tuberculosis and other infections endemic to region (i.e. Cryptococcus, histoplasmosis etc.), malignancies, demyelinating disease, hypersensitivity reactions  Patient verbalized understanding and has agreed to be treated with cimzia and to notify clinic staff of any adverse effects or changes in clinical history.   -avoid MTX/LEF due to child bearing age and no reliable birth control method  -start prednisone taper due to active disease    2. Sjogren's syndrome, with unspecified organ involvement  -SSA + / SSB +  -asymptomatic  -could be secondary to RA.     3. Vitamin D deficiency  -In 2023 level was 27  -recheck again now   - Vitamin D; Future      Follow up in about 4 weeks (around 5/16/2024).    Method of contact with patient concerns: Emmett attn Rheumatology    Disclaimer:  This note is  prepared using voice recognition software and as such is likely to have errors and has not been proof read. Please contact me for questions.     Time spent: 45 minutes in face to face discussion concerning diagnosis, prognosis, review of lab and test results, benefits of treatment as well as management of disease, counseling of patient and coordination of care between various health care providers.  Greater than half the time spent was used for coordination of care and counseling of patient.    Oriana Barber M.D.  Rheumatology  [facility]

## 2024-04-18 NOTE — PROGRESS NOTES
4/18/2024    12:35 PM   Rapid3 Question Responses and Scores   MDHAQ Score 0.1   Psychologic Score 2.2   Pain Score 6.5   When you awakened in the morning OVER THE LAST WEEK, did you feel stiff? Yes   If Yes, please indicate the number of hours until you are as limber as you will be for the day 6   Fatigue Score 9   Global Health Score 5.5   RAPID3 Score 4.11

## 2024-04-22 ENCOUNTER — LAB VISIT (OUTPATIENT)
Dept: LAB | Facility: HOSPITAL | Age: 39
End: 2024-04-22
Attending: STUDENT IN AN ORGANIZED HEALTH CARE EDUCATION/TRAINING PROGRAM
Payer: MEDICAID

## 2024-04-22 DIAGNOSIS — M05.79 RHEUMATOID ARTHRITIS INVOLVING MULTIPLE SITES WITH POSITIVE RHEUMATOID FACTOR: ICD-10-CM

## 2024-04-22 DIAGNOSIS — M35.00 SJOGREN'S SYNDROME, WITH UNSPECIFIED ORGAN INVOLVEMENT: ICD-10-CM

## 2024-04-22 PROCEDURE — 86480 TB TEST CELL IMMUN MEASURE: CPT | Performed by: STUDENT IN AN ORGANIZED HEALTH CARE EDUCATION/TRAINING PROGRAM

## 2024-04-23 ENCOUNTER — TELEPHONE (OUTPATIENT)
Dept: RHEUMATOLOGY | Facility: CLINIC | Age: 39
End: 2024-04-23
Payer: MEDICAID

## 2024-04-23 DIAGNOSIS — E55.9 VITAMIN D DEFICIENCY: Primary | ICD-10-CM

## 2024-04-23 DIAGNOSIS — M05.79 RHEUMATOID ARTHRITIS INVOLVING MULTIPLE SITES WITH POSITIVE RHEUMATOID FACTOR: ICD-10-CM

## 2024-04-23 LAB
GAMMA INTERFERON BACKGROUND BLD IA-ACNC: 0.03 IU/ML
M TB IFN-G CD4+ BCKGRND COR BLD-ACNC: 0.01 IU/ML
M TB IFN-G CD4+ BCKGRND COR BLD-ACNC: 0.02 IU/ML
MITOGEN IGNF BCKGRD COR BLD-ACNC: 9.97 IU/ML
TB GOLD PLUS: NEGATIVE

## 2024-04-23 RX ORDER — CHOLECALCIFEROL (VITAMIN D3) 1250 MCG
1250 TABLET ORAL
Qty: 12 TABLET | Refills: 2 | Status: SHIPPED | OUTPATIENT
Start: 2024-04-23

## 2024-04-23 RX ORDER — CERTOLIZUMAB PEGOL 200 MG/ML
200 INJECTION, SOLUTION SUBCUTANEOUS
Qty: 1 EACH | Refills: 11 | Status: ACTIVE | OUTPATIENT
Start: 2024-04-23

## 2024-04-23 NOTE — TELEPHONE ENCOUNTER
Spoke with patient regarding her lab results per Dr. Armenta. Patient voiced her understandings and will follow up as scheduled.

## 2024-04-23 NOTE — TELEPHONE ENCOUNTER
----- Message from Oriana Barber MD sent at 4/23/2024 10:09 AM CDT -----  Please contact the patient with the attached results. One of the markers of inflammation is elevated which goes along with active disease during last visit. Vitamin D is lower than before so I am going to send a refill of the high dose vitamin D. Other test are negative. Still waiting on the tuberculosis test.   Please let her know that I am going to send Cimzia to [facility].

## 2024-05-16 ENCOUNTER — OFFICE VISIT (OUTPATIENT)
Dept: RHEUMATOLOGY | Facility: CLINIC | Age: 39
End: 2024-05-16
Payer: MEDICAID

## 2024-05-16 VITALS
SYSTOLIC BLOOD PRESSURE: 127 MMHG | WEIGHT: 182.75 LBS | HEIGHT: 59 IN | DIASTOLIC BLOOD PRESSURE: 88 MMHG | HEART RATE: 81 BPM | BODY MASS INDEX: 36.84 KG/M2

## 2024-05-16 DIAGNOSIS — E55.9 VITAMIN D DEFICIENCY: ICD-10-CM

## 2024-05-16 DIAGNOSIS — D84.821 DRUG-INDUCED IMMUNODEFICIENCY: ICD-10-CM

## 2024-05-16 DIAGNOSIS — M05.79 RHEUMATOID ARTHRITIS INVOLVING MULTIPLE SITES WITH POSITIVE RHEUMATOID FACTOR: Primary | ICD-10-CM

## 2024-05-16 DIAGNOSIS — M35.00 SJOGREN'S SYNDROME, WITH UNSPECIFIED ORGAN INVOLVEMENT: ICD-10-CM

## 2024-05-16 DIAGNOSIS — Z79.899 DRUG-INDUCED IMMUNODEFICIENCY: ICD-10-CM

## 2024-05-16 PROCEDURE — 99214 OFFICE O/P EST MOD 30 MIN: CPT | Mod: S$PBB,,, | Performed by: STUDENT IN AN ORGANIZED HEALTH CARE EDUCATION/TRAINING PROGRAM

## 2024-05-16 PROCEDURE — 99999 PR PBB SHADOW E&M-EST. PATIENT-LVL III: CPT | Mod: PBBFAC,,, | Performed by: STUDENT IN AN ORGANIZED HEALTH CARE EDUCATION/TRAINING PROGRAM

## 2024-05-16 PROCEDURE — 3074F SYST BP LT 130 MM HG: CPT | Mod: CPTII,,, | Performed by: STUDENT IN AN ORGANIZED HEALTH CARE EDUCATION/TRAINING PROGRAM

## 2024-05-16 PROCEDURE — 3079F DIAST BP 80-89 MM HG: CPT | Mod: CPTII,,, | Performed by: STUDENT IN AN ORGANIZED HEALTH CARE EDUCATION/TRAINING PROGRAM

## 2024-05-16 PROCEDURE — 99213 OFFICE O/P EST LOW 20 MIN: CPT | Mod: PBBFAC,PN | Performed by: STUDENT IN AN ORGANIZED HEALTH CARE EDUCATION/TRAINING PROGRAM

## 2024-05-16 PROCEDURE — 3008F BODY MASS INDEX DOCD: CPT | Mod: CPTII,,, | Performed by: STUDENT IN AN ORGANIZED HEALTH CARE EDUCATION/TRAINING PROGRAM

## 2024-05-16 RX ORDER — ASPIRIN 325 MG
TABLET, DELAYED RELEASE (ENTERIC COATED) ORAL
COMMUNITY
Start: 2024-04-26 | End: 2024-05-16 | Stop reason: SDUPTHER

## 2024-05-16 NOTE — PROGRESS NOTES
RHEUMATOLOGY OUTPATIENT CLINIC NOTE    5/16/2024    Attending Rheumatologist: Oriana Barber  Primary Care Provider: No, Primary Doctor   Physician Requesting Consultation: No referring provider defined for this encounter.  Chief Complaint/Reason For Consultation:  Rheumatoid arthritis involving multiple sites with positive      Subjective:       HPI  Rhea Sandra is a 39 y.o. Black or  female who comes for evaluation of RA.    Interim history  -Last visit on 4/2024.   -We resumed cimzia 200 mg SC every 14 days. No side effects, no injection reactions. She is also back on  mg QD.   -Symptoms improved with prednisone taper. Currently taking prednisone 5 mg daily.   -Recent labs showed elevated ESR. CMP normal. Hepatitis B and C serologies negative. Vitamin D 17.   -She is doing well without any complaints. Has intermittent pain in right wrist.     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 Then delivered her child in Feb 2023 and a couple of weeks later, she developed feet, hand, wrist, shoulder pain. Also had pain in left hip. Unable to make a fist. She was evaluated by Dr. Craig last year. Started on  mg BID and Cimzia 200 mg every 2 weeks (she is breastfeeding). This combination of medications helped significantly with her joint pain and swelling. She ran out of medications about 4 months ago. She has been in the emergency room 3 times with flares of RA which improve with steroid.   She is still breastfeeding now.     She was also taking vitamin D once a week.     Labs  7/2023  QuantiFERON negative  Hepatitis B and C serologies negative  HIV negative    6/2023  GABO negative    RF positive  SSA, SSB positive   ACE normal  IL 2 receptor negative  Anticardiolipin, beta 2 glycoprotein, lupus anticoagulant negative  CRP 15.7  ESR 34  CMP with normal kidney and liver function  CBC hemoglobin 11.5, WBC 4.2,  platelets 303  Vitamin D 27     Review of Systems   Constitutional:  Negative for fever and unexpected weight change.   HENT:  Negative for mouth sores and trouble swallowing.    Eyes:  Negative for redness.   Respiratory:  Negative for cough and shortness of breath.    Cardiovascular:  Negative for chest pain.   Gastrointestinal:  Positive for diarrhea. Negative for constipation.   Genitourinary:  Negative for dysuria and genital sores.   Integumentary:  Negative for rash.   Neurological:  Negative for headaches.   Hematological:  Does not bruise/bleed easily.        Chronic comorbid conditions affecting medical decision making today:  Past Medical History:   Diagnosis Date    Rheumatoid arthritis, unspecified     Sjogren's disease      Past Surgical History:   Procedure Laterality Date    DILATION AND CURETTAGE OF UTERUS       No family history on file.  Social History     Substance and Sexual Activity   Alcohol Use Yes    Comment: occassionally     Social History     Tobacco Use   Smoking Status Never   Smokeless Tobacco Not on file     Social History     Substance and Sexual Activity   Drug Use Not on file       Current Outpatient Medications:     certolizumab pegol (CIMZIA) 400 mg/2 mL (200 mg/mL x 2) SyKt, Inject 1 mL (200 mg total) into the skin every 14 (fourteen) days., Disp: 1 each, Rfl: 11    cholecalciferol, vitamin D3, 1,250 mcg (50,000 unit) Tab, Take 1,250 mcg by mouth every 7 days., Disp: 12 tablet, Rfl: 2    hydroxychloroquine (PLAQUENIL) 200 mg tablet, Take 2 tablets (400 mg total) by mouth once daily., Disp: 60 tablet, Rfl: 11    predniSONE (DELTASONE) 5 MG tablet, Take 3 tablets (15 mg total) by mouth once daily for 7 days, THEN 2 tablets (10 mg total) once daily for 7 days, THEN 1 tablet (5 mg total) once daily., Disp: 63 tablet, Rfl: 0    prenatal vit,rafi 74-iron-folic 27 mg iron- 1 mg Tab, Take by mouth., Disp: , Rfl:      Objective:         Vitals:    05/16/24 0824   BP: 127/88   Pulse: 81      Physical Exam   Constitutional: She is oriented to person, place, and time. She appears well-developed.   HENT:   Head: Normocephalic.   Mouth/Throat: Mucous membranes are moist.   Pulmonary/Chest: Effort normal.   Abdominal: Soft.   Musculoskeletal:      Cervical back: Neck supple.      Comments: Cspine FROM no tenderness  Tspine FROM no tenderness  Lspine FROM no tenderness.  Shoulders: FROM; no synovitis;  Elbows: FROM; no synovitis; no tophi. Right elbow nodule.   Wrists: right wrist with synovitis.   MCPs: FROM; no synovitis, tenderness in multiple MCPs  PIPs:FROM; no synovitis;   DIPs: FROM; no synovitis;   HIPS: FROM  Knees: FROM; no synovitis; no instability  Ankles: right ankle with swelling and tenderness.   Toes: no tenderness on palpation.     Lymphadenopathy:     She has no cervical adenopathy.   Neurological: She is alert and oriented to person, place, and time.   Skin: No rash noted.   Vitals reviewed.    CDAI Score: 7 / 76         Reviewed old and all outside pertinent medical records available.    All lab results personally reviewed and interpreted by me.  Lab Results   Component Value Date    WBC 7.48 04/22/2024    HGB 12.2 04/22/2024    HCT 37.2 04/22/2024    MCV 89 04/22/2024    MCH 29.3 04/22/2024    MCHC 32.8 04/22/2024    RDW 13.4 04/22/2024     04/22/2024    MPV 12.4 04/22/2024    PLTEST Adequate 05/01/2009       Lab Results   Component Value Date     04/22/2024    K 4.0 04/22/2024     04/22/2024    CO2 21 (L) 04/22/2024    GLU 96 04/22/2024    BUN 12 04/22/2024    CALCIUM 9.2 04/22/2024    PROT 8.5 (H) 04/22/2024    ALBUMIN 3.7 04/22/2024    BILITOT 0.4 04/22/2024    AST 16 04/22/2024    ALKPHOS 64 04/22/2024    ALT 18 04/22/2024       Lab Results   Component Value Date    COLORU Yellow 02/27/2023    APPEARANCEUA Clear 12/27/2021    SPECGRAV 1.010 12/27/2021    PHUR 6.0 12/27/2021    PROTEINUA Negative 12/27/2021    KETONESU Negative 12/27/2021    LEUKOCYTESUR Negative  "02/27/2023    NITRITE Negative 12/27/2021    UROBILINOGEN 0.2 02/27/2023       Lab Results   Component Value Date    CRP 3.2 04/22/2024       Lab Results   Component Value Date    SEDRATE 74 (H) 04/22/2024       No components found for: "25OHVITDTOT", "90RWLQAQ1", "54YZPUGC4", "METHODNOTE"    No results found for: "URICACID"    Lab Results   Component Value Date    HEPBSAB <3.00 04/22/2024    HEPBSAB Non-reactive 04/22/2024    HEPBSAG Non-reactive 04/22/2024    HEPCAB Non-reactive 04/22/2024       Imaging:  All imaging reviewed and independently interpreted by me.     ASSESSMENT / PLAN:     Rhea Sandra is a 39 y.o. Black or  female with:    Seropositive, nodular rheumatoid arthritis ACTIVE  -RF + / CCP +. Elevated ESR   -right elbow rheumatoid nodule  -low disease activity now  -continue Cimzia 200 mg SC every 2 weeks   -continue  mg BID. Advised her to establish care with ophthalmology   -avoid MTX/LEF due to child bearing age and no reliable birth control method  -continue prednisone 5 mg for 1 more week then stop.     2. Drug induced immunodeficiency  - recent labs reviewed  - no live vaccines  - vaccines per guidelines   - immunosuppression/infectious precautions reinforced      3. Sjogren's syndrome, with unspecified organ involvement  -SSA + / SSB +  -asymptomatic  -could be secondary to RA.     4. Vitamin D deficiency  -Level was 17   -on cholecalciferol 69128 IU every week.   -recheck in next set of labs       Follow up in about 3 months (around 8/16/2024).    Method of contact with patient concerns: Emmett attn Rheumatology    Disclaimer:  This note is prepared using voice recognition software and as such is likely to have errors and has not been proof read. Please contact me for questions.     Time spent: 20 minutes in face to face discussion concerning diagnosis, prognosis, review of lab and test results, benefits of treatment as well as management of disease, counseling of " patient and coordination of care between various health care providers.  Greater than half the time spent was used for coordination of care and counseling of patient.    Oriana Barber M.D.  Rheumatology  [facility]

## 2024-07-18 ENCOUNTER — PATIENT MESSAGE (OUTPATIENT)
Dept: RHEUMATOLOGY | Facility: CLINIC | Age: 39
End: 2024-07-18
Payer: MEDICAID

## 2024-07-25 ENCOUNTER — OFFICE VISIT (OUTPATIENT)
Dept: RHEUMATOLOGY | Facility: CLINIC | Age: 39
End: 2024-07-25
Payer: MEDICAID

## 2024-07-25 DIAGNOSIS — D84.821 DRUG-INDUCED IMMUNODEFICIENCY: ICD-10-CM

## 2024-07-25 DIAGNOSIS — M05.79 RHEUMATOID ARTHRITIS INVOLVING MULTIPLE SITES WITH POSITIVE RHEUMATOID FACTOR: Primary | ICD-10-CM

## 2024-07-25 DIAGNOSIS — Z79.899 DRUG-INDUCED IMMUNODEFICIENCY: ICD-10-CM

## 2024-07-25 DIAGNOSIS — E55.9 VITAMIN D DEFICIENCY: ICD-10-CM

## 2024-07-25 DIAGNOSIS — M35.00 SJOGREN'S SYNDROME, WITH UNSPECIFIED ORGAN INVOLVEMENT: ICD-10-CM

## 2024-07-25 RX ORDER — PREDNISONE 5 MG/1
TABLET ORAL
Qty: 75 TABLET | Refills: 0 | Status: SHIPPED | OUTPATIENT
Start: 2024-07-25 | End: 2024-09-13

## 2024-07-25 RX ORDER — ETANERCEPT 50 MG/ML
50 SOLUTION SUBCUTANEOUS WEEKLY
Qty: 4 EACH | Refills: 11 | Status: SHIPPED | OUTPATIENT
Start: 2024-07-25 | End: 2025-07-25

## 2024-07-25 NOTE — PROGRESS NOTES
RHEUMATOLOGY OUTPATIENT CLINIC NOTE    7/25/2024    Attending Rheumatologist: Oriana Barber  Primary Care Provider: No, Primary Doctor   Physician Requesting Consultation: No referring provider defined for this encounter.  Chief Complaint/Reason For Consultation:  No chief complaint on file.    The patient location is: work  The chief complaint leading to consultation is:  Rheumatoid arthritis flare  Visit type: Virtual visit with synchronous audio and video  Total time spent with patient:  10 minutes    Each patient to whom he or she provides medical services by telemedicine is:  (1) informed of the relationship between the physician and patient and the respective role of any other health care provider with respect to management of the patient; and (2) notified that he or she may decline to receive medical services by telemedicine and may withdraw from such care at any time.   Subjective:       NEHEMIAH Sandra is a 39 y.o. Black or  female who comes for evaluation of RA.    Interim history  -last visit on 5/2024  -resumed cimzia 200 mg SC every 2 weeks in April 2024 however reports that it is not working as good as it was before to control her RA. She reports pain in left elbow, bilateral feet and ankles. Right ankle has been swelling.  She is not able to fully extend her left arm  -she is on  mg daily   -ran out of prednisone 5 mg last week. 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 Then delivered her child in Feb 2023 and a couple of weeks later, she developed feet, hand, wrist, shoulder pain. Also had pain in left hip. Unable to make a fist. She was evaluated by Dr. Craig last year. Started on  mg BID and Cimzia 200 mg every 2 weeks (she is breastfeeding). This combination of medications helped significantly with her joint pain and swelling. She ran out of medications about 4  months ago. She has been in the emergency room 3 times with flares of RA which improve with steroid.   She is still breastfeeding now.     She was also taking vitamin D once a week.     Labs  7/2023  QuantiFERON negative  Hepatitis B and C serologies negative  HIV negative    6/2023  GABO negative    RF positive  SSA, SSB positive   ACE normal  IL 2 receptor negative  Anticardiolipin, beta 2 glycoprotein, lupus anticoagulant negative  CRP 15.7  ESR 34  CMP with normal kidney and liver function  CBC hemoglobin 11.5, WBC 4.2, platelets 303  Vitamin D 27     Review of Systems   Constitutional:  Negative for fever and unexpected weight change.   HENT:  Negative for mouth sores and trouble swallowing.    Eyes:  Negative for redness.   Respiratory:  Negative for cough and shortness of breath.    Cardiovascular:  Negative for chest pain.   Gastrointestinal:  Positive for diarrhea. Negative for constipation.   Genitourinary:  Negative for dysuria and genital sores.   Integumentary:  Negative for rash.   Neurological:  Negative for headaches.   Hematological:  Does not bruise/bleed easily.        Chronic comorbid conditions affecting medical decision making today:  Past Medical History:   Diagnosis Date    Rheumatoid arthritis, unspecified     Sjogren's disease      Past Surgical History:   Procedure Laterality Date    DILATION AND CURETTAGE OF UTERUS       No family history on file.  Social History     Substance and Sexual Activity   Alcohol Use Yes    Comment: occassionally     Social History     Tobacco Use   Smoking Status Never   Smokeless Tobacco Not on file     Social History     Substance and Sexual Activity   Drug Use Not on file       Current Outpatient Medications:     cholecalciferol, vitamin D3, 1,250 mcg (50,000 unit) Tab, Take 1,250 mcg by mouth every 7 days., Disp: 12 tablet, Rfl: 2    etanercept (ENBREL SURECLICK) 50 mg/mL (1 mL), Inject 1 mL (50 mg total) into the skin once a week., Disp: 4 each, Rfl:  11    hydroxychloroquine (PLAQUENIL) 200 mg tablet, Take 2 tablets (400 mg total) by mouth once daily., Disp: 60 tablet, Rfl: 11    predniSONE (DELTASONE) 5 MG tablet, Take 3 tablets (15 mg total) by mouth once daily for 5 days, THEN 2.5 tablets (12.5 mg total) once daily for 5 days, THEN 2 tablets (10 mg total) once daily for 5 days, THEN 1.5 tablets (7.5 mg total) once daily for 5 days, THEN 1 tablet (5 mg total) once daily., Disp: 75 tablet, Rfl: 0    prenatal vit,rafi 74-iron-folic 27 mg iron- 1 mg Tab, Take by mouth., Disp: , Rfl:      Objective:         There were no vitals filed for this visit.    Physical Exam   Constitutional: She is oriented to person, place, and time. She appears well-developed.   HENT:   Head: Normocephalic.   Mouth/Throat: Mucous membranes are moist.   Pulmonary/Chest: Effort normal.   Abdominal: Soft.   Musculoskeletal:      Cervical back: Neck supple.      Comments: Cspine FROM no tenderness  Tspine FROM no tenderness  Lspine FROM no tenderness.  Shoulders: FROM; no synovitis;  Elbows: FROM; no synovitis; no tophi. Right elbow nodule.   Wrists: right wrist with synovitis.   MCPs: FROM; no synovitis, tenderness in multiple MCPs  PIPs:FROM; no synovitis;   DIPs: FROM; no synovitis;   HIPS: FROM  Knees: FROM; no synovitis; no instability  Ankles: right ankle with swelling and tenderness.   Toes: no tenderness on palpation.     Lymphadenopathy:     She has no cervical adenopathy.   Neurological: She is alert and oriented to person, place, and time.   Skin: No rash noted.   Vitals reviewed.    CDAI Score: -- Unable to calculate due to virtual visit    Virtual visit 07/25:  No rashes in face.  Unable to fully extend left elbow.  Noted swelling in both ankles       Reviewed old and all outside pertinent medical records available.    All lab results personally reviewed and interpreted by me.  Lab Results   Component Value Date    WBC 7.48 04/22/2024    HGB 12.2 04/22/2024    HCT 37.2  "04/22/2024    MCV 89 04/22/2024    MCH 29.3 04/22/2024    MCHC 32.8 04/22/2024    RDW 13.4 04/22/2024     04/22/2024    MPV 12.4 04/22/2024    PLTEST Adequate 05/01/2009       Lab Results   Component Value Date     04/22/2024    K 4.0 04/22/2024     04/22/2024    CO2 21 (L) 04/22/2024    GLU 96 04/22/2024    BUN 12 04/22/2024    CALCIUM 9.2 04/22/2024    PROT 8.5 (H) 04/22/2024    ALBUMIN 3.7 04/22/2024    BILITOT 0.4 04/22/2024    AST 16 04/22/2024    ALKPHOS 64 04/22/2024    ALT 18 04/22/2024       Lab Results   Component Value Date    COLORU Yellow 02/27/2023    APPEARANCEUA Clear 12/27/2021    SPECGRAV 1.010 12/27/2021    PHUR 6.0 12/27/2021    PROTEINUA Negative 12/27/2021    KETONESU Negative 12/27/2021    LEUKOCYTESUR Negative 02/27/2023    NITRITE Negative 12/27/2021    UROBILINOGEN 0.2 02/27/2023       Lab Results   Component Value Date    CRP 3.2 04/22/2024       Lab Results   Component Value Date    SEDRATE 74 (H) 04/22/2024       No components found for: "25OHVITDTOT", "12YVSDDT7", "26PAUYYU6", "METHODNOTE"    No results found for: "URICACID"    Lab Results   Component Value Date    HEPBSAB <3.00 04/22/2024    HEPBSAB Non-reactive 04/22/2024    HEPBSAG Non-reactive 04/22/2024    HEPCAB Non-reactive 04/22/2024       Imaging:  All imaging reviewed and independently interpreted by me.     ASSESSMENT / PLAN:     Rhea Sandra is a 39 y.o. Black or  female with:    Seropositive, nodular rheumatoid arthritis ACTIVE  -RF + / CCP +. Elevated ESR   -right elbow rheumatoid nodule  -has been on Cimzia 200 mg SC every 2 weeks since April 2024, however symptoms have worsened and are uncontrolled with Cimzia.  -stop Cimzia now and switch to Enbrel 50 mg subQ every week.  Discussed side effects with patient, similar to Cimzia.  -continue  mg BID. Advised her to establish care with ophthalmology   -avoid MTX/LEF due to child bearing age and no reliable birth control " method  -start prednisone taper due to active disease.    2. Drug induced immunodeficiency  - recent labs reviewed  - no live vaccines  - vaccines per guidelines   - immunosuppression/infectious precautions reinforced      3. Sjogren's syndrome, with unspecified organ involvement  -SSA + / SSB +  -asymptomatic  -could be secondary to RA.     4. Vitamin D deficiency  -Level was 17   -on cholecalciferol 79335 IU every week.   -recheck in next set of labs       Follow up in about 4 weeks (around 8/22/2024).    Method of contact with patient concerns: Emmett attn Rheumatology    Disclaimer:  This note is prepared using voice recognition software and as such is likely to have errors and has not been proof read. Please contact me for questions.     Time spent: 20 minutes in face to face discussion concerning diagnosis, prognosis, review of lab and test results, benefits of treatment as well as management of disease, counseling of patient and coordination of care between various health care providers.  Greater than half the time spent was used for coordination of care and counseling of patient.    Oriana Barber M.D.  Rheumatology  [facility]

## 2024-07-25 NOTE — PATIENT INSTRUCTIONS
We will start the process for Enbrel injections.  In the meantime continue using Cimzia.  When you receive the shipment for Enbrel, weight 2 weeks after last injection of Cimzia to start Enbrel.    Continue hydroxychloroquine 200 mg twice a day    Due to significant joint pain, we will start prednisone taper as follows:    Take 3 tablets (15 mg total) by mouth once daily for 5 days, THEN 2.5 tablets (12.5 mg total) once daily for 5 days, THEN 2 tablets (10 mg total) once daily for 5 days, THEN 1.5 tablets (7.5 mg total) once daily for 5 days, THEN 1 tablet (5 mg total) once daily.

## 2024-08-12 ENCOUNTER — LAB VISIT (OUTPATIENT)
Dept: LAB | Facility: HOSPITAL | Age: 39
End: 2024-08-12
Attending: STUDENT IN AN ORGANIZED HEALTH CARE EDUCATION/TRAINING PROGRAM
Payer: MEDICAID

## 2024-08-12 DIAGNOSIS — M05.79 RHEUMATOID ARTHRITIS INVOLVING MULTIPLE SITES WITH POSITIVE RHEUMATOID FACTOR: ICD-10-CM

## 2024-08-12 DIAGNOSIS — Z79.899 DRUG-INDUCED IMMUNODEFICIENCY: ICD-10-CM

## 2024-08-12 DIAGNOSIS — E55.9 VITAMIN D DEFICIENCY: ICD-10-CM

## 2024-08-12 DIAGNOSIS — M35.00 SJOGREN'S SYNDROME, WITH UNSPECIFIED ORGAN INVOLVEMENT: ICD-10-CM

## 2024-08-12 DIAGNOSIS — D84.821 DRUG-INDUCED IMMUNODEFICIENCY: ICD-10-CM

## 2024-08-12 LAB
25(OH)D3+25(OH)D2 SERPL-MCNC: 44 NG/ML (ref 30–96)
ALBUMIN SERPL BCP-MCNC: 3.5 G/DL (ref 3.5–5.2)
ALP SERPL-CCNC: 47 U/L (ref 55–135)
ALT SERPL W/O P-5'-P-CCNC: 42 U/L (ref 10–44)
ANION GAP SERPL CALC-SCNC: 8 MMOL/L (ref 8–16)
AST SERPL-CCNC: 31 U/L (ref 10–40)
BASOPHILS # BLD AUTO: 0.05 K/UL (ref 0–0.2)
BASOPHILS NFR BLD: 0.7 % (ref 0–1.9)
BILIRUB SERPL-MCNC: 0.2 MG/DL (ref 0.1–1)
BUN SERPL-MCNC: 15 MG/DL (ref 6–20)
CALCIUM SERPL-MCNC: 9.2 MG/DL (ref 8.7–10.5)
CHLORIDE SERPL-SCNC: 109 MMOL/L (ref 95–110)
CO2 SERPL-SCNC: 23 MMOL/L (ref 23–29)
CREAT SERPL-MCNC: 0.8 MG/DL (ref 0.5–1.4)
CRP SERPL-MCNC: 2 MG/L (ref 0–8.2)
DIFFERENTIAL METHOD BLD: ABNORMAL
EOSINOPHIL # BLD AUTO: 0.1 K/UL (ref 0–0.5)
EOSINOPHIL NFR BLD: 1.2 % (ref 0–8)
ERYTHROCYTE [DISTWIDTH] IN BLOOD BY AUTOMATED COUNT: 13.2 % (ref 11.5–14.5)
ERYTHROCYTE [SEDIMENTATION RATE] IN BLOOD BY PHOTOMETRIC METHOD: 44 MM/HR (ref 0–36)
EST. GFR  (NO RACE VARIABLE): >60 ML/MIN/1.73 M^2
GLUCOSE SERPL-MCNC: 76 MG/DL (ref 70–110)
HCT VFR BLD AUTO: 35.4 % (ref 37–48.5)
HGB BLD-MCNC: 11.4 G/DL (ref 12–16)
IMM GRANULOCYTES # BLD AUTO: 0.02 K/UL (ref 0–0.04)
IMM GRANULOCYTES NFR BLD AUTO: 0.3 % (ref 0–0.5)
LYMPHOCYTES # BLD AUTO: 3.1 K/UL (ref 1–4.8)
LYMPHOCYTES NFR BLD: 42.1 % (ref 18–48)
MCH RBC QN AUTO: 29 PG (ref 27–31)
MCHC RBC AUTO-ENTMCNC: 32.2 G/DL (ref 32–36)
MCV RBC AUTO: 90 FL (ref 82–98)
MONOCYTES # BLD AUTO: 0.5 K/UL (ref 0.3–1)
MONOCYTES NFR BLD: 7.5 % (ref 4–15)
NEUTROPHILS # BLD AUTO: 3.5 K/UL (ref 1.8–7.7)
NEUTROPHILS NFR BLD: 48.2 % (ref 38–73)
NRBC BLD-RTO: 0 /100 WBC
PLATELET # BLD AUTO: 292 K/UL (ref 150–450)
PMV BLD AUTO: 12.9 FL (ref 9.2–12.9)
POTASSIUM SERPL-SCNC: 3.7 MMOL/L (ref 3.5–5.1)
PROT SERPL-MCNC: 8.2 G/DL (ref 6–8.4)
RBC # BLD AUTO: 3.93 M/UL (ref 4–5.4)
SODIUM SERPL-SCNC: 140 MMOL/L (ref 136–145)
WBC # BLD AUTO: 7.24 K/UL (ref 3.9–12.7)

## 2024-08-12 PROCEDURE — 86140 C-REACTIVE PROTEIN: CPT | Performed by: STUDENT IN AN ORGANIZED HEALTH CARE EDUCATION/TRAINING PROGRAM

## 2024-08-12 PROCEDURE — 82306 VITAMIN D 25 HYDROXY: CPT | Performed by: STUDENT IN AN ORGANIZED HEALTH CARE EDUCATION/TRAINING PROGRAM

## 2024-08-12 PROCEDURE — 85652 RBC SED RATE AUTOMATED: CPT | Performed by: STUDENT IN AN ORGANIZED HEALTH CARE EDUCATION/TRAINING PROGRAM

## 2024-08-12 PROCEDURE — 80053 COMPREHEN METABOLIC PANEL: CPT | Performed by: STUDENT IN AN ORGANIZED HEALTH CARE EDUCATION/TRAINING PROGRAM

## 2024-08-12 PROCEDURE — 85025 COMPLETE CBC W/AUTO DIFF WBC: CPT | Performed by: STUDENT IN AN ORGANIZED HEALTH CARE EDUCATION/TRAINING PROGRAM

## 2024-08-12 PROCEDURE — 36415 COLL VENOUS BLD VENIPUNCTURE: CPT | Performed by: STUDENT IN AN ORGANIZED HEALTH CARE EDUCATION/TRAINING PROGRAM

## 2024-08-13 ENCOUNTER — PATIENT MESSAGE (OUTPATIENT)
Dept: RHEUMATOLOGY | Facility: CLINIC | Age: 39
End: 2024-08-13
Payer: MEDICAID

## 2024-08-20 ENCOUNTER — OFFICE VISIT (OUTPATIENT)
Dept: RHEUMATOLOGY | Facility: CLINIC | Age: 39
End: 2024-08-20
Payer: MEDICAID

## 2024-08-20 VITALS
SYSTOLIC BLOOD PRESSURE: 129 MMHG | WEIGHT: 169.31 LBS | HEART RATE: 77 BPM | DIASTOLIC BLOOD PRESSURE: 83 MMHG | BODY MASS INDEX: 34.13 KG/M2 | HEIGHT: 59 IN

## 2024-08-20 DIAGNOSIS — M05.79 RHEUMATOID ARTHRITIS INVOLVING MULTIPLE SITES WITH POSITIVE RHEUMATOID FACTOR: Primary | ICD-10-CM

## 2024-08-20 DIAGNOSIS — M35.00 SJOGREN'S SYNDROME, WITH UNSPECIFIED ORGAN INVOLVEMENT: ICD-10-CM

## 2024-08-20 DIAGNOSIS — Z71.89 COUNSELING AND COORDINATION OF CARE: ICD-10-CM

## 2024-08-20 DIAGNOSIS — D84.821 DRUG-INDUCED IMMUNODEFICIENCY: ICD-10-CM

## 2024-08-20 DIAGNOSIS — Z79.899 DRUG-INDUCED IMMUNODEFICIENCY: ICD-10-CM

## 2024-08-20 PROCEDURE — 3074F SYST BP LT 130 MM HG: CPT | Mod: CPTII,,, | Performed by: STUDENT IN AN ORGANIZED HEALTH CARE EDUCATION/TRAINING PROGRAM

## 2024-08-20 PROCEDURE — 1159F MED LIST DOCD IN RCRD: CPT | Mod: CPTII,,, | Performed by: STUDENT IN AN ORGANIZED HEALTH CARE EDUCATION/TRAINING PROGRAM

## 2024-08-20 PROCEDURE — 3008F BODY MASS INDEX DOCD: CPT | Mod: CPTII,,, | Performed by: STUDENT IN AN ORGANIZED HEALTH CARE EDUCATION/TRAINING PROGRAM

## 2024-08-20 PROCEDURE — 99999 PR PBB SHADOW E&M-EST. PATIENT-LVL III: CPT | Mod: PBBFAC,,, | Performed by: STUDENT IN AN ORGANIZED HEALTH CARE EDUCATION/TRAINING PROGRAM

## 2024-08-20 PROCEDURE — 3079F DIAST BP 80-89 MM HG: CPT | Mod: CPTII,,, | Performed by: STUDENT IN AN ORGANIZED HEALTH CARE EDUCATION/TRAINING PROGRAM

## 2024-08-20 PROCEDURE — 99213 OFFICE O/P EST LOW 20 MIN: CPT | Mod: PBBFAC,PN | Performed by: STUDENT IN AN ORGANIZED HEALTH CARE EDUCATION/TRAINING PROGRAM

## 2024-08-20 PROCEDURE — 99214 OFFICE O/P EST MOD 30 MIN: CPT | Mod: S$PBB,,, | Performed by: STUDENT IN AN ORGANIZED HEALTH CARE EDUCATION/TRAINING PROGRAM

## 2024-08-20 RX ORDER — PREDNISONE 5 MG/1
5 TABLET ORAL DAILY
Qty: 30 TABLET | Refills: 1 | Status: SHIPPED | OUTPATIENT
Start: 2024-08-20 | End: 2024-10-19

## 2024-08-20 NOTE — PROGRESS NOTES
8/20/2024     7:39 AM   Rapid3 Question Responses and Scores   MDHAQ Score 0.3   Psychologic Score 1.1   Pain Score 5   When you awakened in the morning OVER THE LAST WEEK, did you feel stiff? Yes   If Yes, please indicate the number of hours until you are as limber as you will be for the day 3   Fatigue Score 3   Global Health Score 2.5   RAPID3 Score 2.83

## 2024-08-20 NOTE — PROGRESS NOTES
RHEUMATOLOGY OUTPATIENT CLINIC NOTE    8/20/2024    Attending Rheumatologist: Oriana Barber  Primary Care Provider: No, Primary Doctor   Physician Requesting Consultation: No referring provider defined for this encounter.  Chief Complaint/Reason For Consultation:  Rheumatoid arthritis involving multiple sites with positive    Subjective:       HPI  Rhea Sandra is a 39 y.o. Black or  female who comes for evaluation of RA.    Interim history  -last visit on 7/2024  -started Enbrel yesterday, no injection site reactions   -has been taking prednisone 5 mg daily. If she skips a dose, she notices worsening pain in right wrist  -continue taking  mg QD  -not breastfeeding anymore. Not on birth control but using condoms. Not planning on any pregnancy.     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 Then delivered her child in Feb 2023 and a couple of weeks later, she developed feet, hand, wrist, shoulder pain. Also had pain in left hip. Unable to make a fist. She was evaluated by Dr. Craig last year. Started on  mg BID and Cimzia 200 mg every 2 weeks (she is breastfeeding). This combination of medications helped significantly with her joint pain and swelling. She ran out of medications about 4 months ago. She has been in the emergency room 3 times with flares of RA which improve with steroid.   She is still breastfeeding now.     She was also taking vitamin D once a week.     Labs  7/2023  QuantiFERON negative  Hepatitis B and C serologies negative  HIV negative    6/2023  GABO negative    RF positive  SSA, SSB positive   ACE normal  IL 2 receptor negative  Anticardiolipin, beta 2 glycoprotein, lupus anticoagulant negative  CRP 15.7  ESR 34  CMP with normal kidney and liver function  CBC hemoglobin 11.5, WBC 4.2, platelets 303  Vitamin D 27     Review of Systems   Constitutional:  Negative for fever and unexpected  weight change.   HENT:  Negative for mouth sores and trouble swallowing.    Eyes:  Negative for redness.   Respiratory:  Negative for cough and shortness of breath.    Cardiovascular:  Negative for chest pain.   Gastrointestinal:  Positive for diarrhea. Negative for constipation.   Genitourinary:  Negative for dysuria and genital sores.   Integumentary:  Negative for rash.   Neurological:  Negative for headaches.   Hematological:  Does not bruise/bleed easily.        Chronic comorbid conditions affecting medical decision making today:  Past Medical History:   Diagnosis Date    Rheumatoid arthritis, unspecified     Sjogren's disease      Past Surgical History:   Procedure Laterality Date    DILATION AND CURETTAGE OF UTERUS       No family history on file.  Social History     Substance and Sexual Activity   Alcohol Use Yes    Comment: occassionally     Social History     Tobacco Use   Smoking Status Never   Smokeless Tobacco Not on file     Social History     Substance and Sexual Activity   Drug Use Not on file       Current Outpatient Medications:     cholecalciferol, vitamin D3, 1,250 mcg (50,000 unit) Tab, Take 1,250 mcg by mouth every 7 days., Disp: 12 tablet, Rfl: 2    etanercept (ENBREL SURECLICK) 50 mg/mL (1 mL), Inject 1 mL (50 mg total) into the skin once a week., Disp: 4 mL, Rfl: 11    hydroxychloroquine (PLAQUENIL) 200 mg tablet, Take 2 tablets (400 mg total) by mouth once daily., Disp: 60 tablet, Rfl: 11    prenatal vit,rafi 74-iron-folic 27 mg iron- 1 mg Tab, Take by mouth., Disp: , Rfl:     predniSONE (DELTASONE) 5 MG tablet, Take 1 tablet (5 mg total) by mouth once daily., Disp: 30 tablet, Rfl: 1     Objective:         Vitals:    08/20/24 1033   BP: 129/83   Pulse: 77       Physical Exam   Constitutional: She is oriented to person, place, and time. She appears well-developed.   HENT:   Head: Normocephalic.   Mouth/Throat: Mucous membranes are moist.   Pulmonary/Chest: Effort normal.   Abdominal: Soft.    Musculoskeletal:      Cervical back: Neck supple.      Comments: Cspine FROM no tenderness  Tspine FROM no tenderness  Lspine FROM no tenderness.  Shoulders: FROM; no synovitis;  Elbows: FROM; no synovitis; no tophi. Right elbow nodule.   Wrists: right wrist with synovitis.   MCPs: FROM; no synovitis, tenderness in multiple MCPs  PIPs:FROM; no synovitis;   DIPs: FROM; no synovitis;   HIPS: FROM  Knees: FROM; no synovitis; no instability  Ankles: right ankle with swelling and tenderness.   Toes: no tenderness on palpation.     Lymphadenopathy:     She has no cervical adenopathy.   Neurological: She is alert and oriented to person, place, and time.   Skin: No rash noted.   Vitals reviewed.    CDAI Score: 7 / 76         Reviewed old and all outside pertinent medical records available.    All lab results personally reviewed and interpreted by me.  Lab Results   Component Value Date    WBC 7.24 08/12/2024    HGB 11.4 (L) 08/12/2024    HCT 35.4 (L) 08/12/2024    MCV 90 08/12/2024    MCH 29.0 08/12/2024    MCHC 32.2 08/12/2024    RDW 13.2 08/12/2024     08/12/2024    MPV 12.9 08/12/2024    PLTEST Adequate 05/01/2009       Lab Results   Component Value Date     08/12/2024    K 3.7 08/12/2024     08/12/2024    CO2 23 08/12/2024    GLU 76 08/12/2024    BUN 15 08/12/2024    CALCIUM 9.2 08/12/2024    PROT 8.2 08/12/2024    ALBUMIN 3.5 08/12/2024    BILITOT 0.2 08/12/2024    AST 31 08/12/2024    ALKPHOS 47 (L) 08/12/2024    ALT 42 08/12/2024       Lab Results   Component Value Date    COLORU Yellow 02/27/2023    APPEARANCEUA Clear 12/27/2021    SPECGRAV 1.010 12/27/2021    PHUR 6.0 12/27/2021    PROTEINUA Negative 12/27/2021    KETONESU Negative 12/27/2021    LEUKOCYTESUR Negative 02/27/2023    NITRITE Negative 12/27/2021    UROBILINOGEN 0.2 02/27/2023       Lab Results   Component Value Date    CRP 2.0 08/12/2024       Lab Results   Component Value Date    SEDRATE 44 (H) 08/12/2024       No components found  "for: "25OHVITDTOT", "31OZPUHE1", "71KFCUNI3", "METHODNOTE"    No results found for: "URICACID"    Lab Results   Component Value Date    HEPBSAB <3.00 04/22/2024    HEPBSAB Non-reactive 04/22/2024    HEPBSAG Non-reactive 04/22/2024    HEPCAB Non-reactive 04/22/2024       Imaging:  All imaging reviewed and independently interpreted by me.     ASSESSMENT / PLAN:     Rhea Sandra is a 39 y.o. Black or  female with:    Seropositive, nodular rheumatoid arthritis ACTIVE  -RF + / CCP +. Elevated ESR   -right elbow rheumatoid nodule  -had been on Cimzia 200 mg SC every 2 weeks since April 2024, however symptoms have worsened and are uncontrolled with Cimzia.  -started Enbrel 50 mg SC QW yesterday. Tolerated well.   -continue  mg BID. Advised her to establish care with ophthalmology   -avoid MTX/LEF due to child bearing age and no reliable birth control method  -continue prednisone 5 mg daily.   -offered right wrist CSI but declined. May consider in the future    2. Drug induced immunodeficiency  - recent labs reviewed  - no live vaccines  - vaccines per guidelines   - immunosuppression/infectious precautions reinforced      3. Sjogren's syndrome, with unspecified organ involvement  -SSA + / SSB +  -negative GABO  -asymptomatic  -could be secondary to RA.     4. Vitamin D deficiency, resolved   -17 > 44  -Continue daily vitamin D supplement    5. Counseling and coordination of care  -referral to internal medicine to establish care with primary care.  Her current primary care is retiring    Follow up in about 7 weeks (around 10/8/2024).    Method of contact with patient concerns: [facility]    Disclaimer:  This note is prepared using voice recognition software and as such is likely to have errors and has not been proof read. Please contact me for questions.     Time spent: 20 minutes in face to face discussion concerning diagnosis, prognosis, review of lab and test results, benefits of " treatment as well as management of disease, counseling of patient and coordination of care between various health care providers.  Greater than half the time spent was used for coordination of care and counseling of patient.    Oriana Barber M.D.  Rheumatology  [facility]

## 2024-08-29 ENCOUNTER — PATIENT MESSAGE (OUTPATIENT)
Dept: RHEUMATOLOGY | Facility: CLINIC | Age: 39
End: 2024-08-29
Payer: MEDICAID

## 2024-09-03 ENCOUNTER — OFFICE VISIT (OUTPATIENT)
Dept: RHEUMATOLOGY | Facility: CLINIC | Age: 39
End: 2024-09-03
Payer: MEDICAID

## 2024-09-03 VITALS
HEIGHT: 59 IN | HEART RATE: 77 BPM | DIASTOLIC BLOOD PRESSURE: 84 MMHG | BODY MASS INDEX: 33.29 KG/M2 | SYSTOLIC BLOOD PRESSURE: 126 MMHG | WEIGHT: 165.13 LBS

## 2024-09-03 DIAGNOSIS — M05.79 RHEUMATOID ARTHRITIS INVOLVING MULTIPLE SITES WITH POSITIVE RHEUMATOID FACTOR: Primary | ICD-10-CM

## 2024-09-03 DIAGNOSIS — M25.431 PAIN AND SWELLING OF RIGHT WRIST: ICD-10-CM

## 2024-09-03 DIAGNOSIS — M25.531 PAIN AND SWELLING OF RIGHT WRIST: ICD-10-CM

## 2024-09-03 PROCEDURE — 99213 OFFICE O/P EST LOW 20 MIN: CPT | Mod: PBBFAC,PN,25 | Performed by: STUDENT IN AN ORGANIZED HEALTH CARE EDUCATION/TRAINING PROGRAM

## 2024-09-03 PROCEDURE — 99999PBSHW PR PBB SHADOW TECHNICAL ONLY FILED TO HB: Mod: PBBFAC,,,

## 2024-09-03 PROCEDURE — 20605 DRAIN/INJ JOINT/BURSA W/O US: CPT | Mod: PBBFAC,PN | Performed by: STUDENT IN AN ORGANIZED HEALTH CARE EDUCATION/TRAINING PROGRAM

## 2024-09-03 PROCEDURE — 99999 PR PBB SHADOW E&M-EST. PATIENT-LVL III: CPT | Mod: PBBFAC,,, | Performed by: STUDENT IN AN ORGANIZED HEALTH CARE EDUCATION/TRAINING PROGRAM

## 2024-09-03 RX ORDER — LIDOCAINE HYDROCHLORIDE 10 MG/ML
2 INJECTION, SOLUTION EPIDURAL; INFILTRATION; INTRACAUDAL; PERINEURAL
Status: DISCONTINUED | OUTPATIENT
Start: 2024-09-03 | End: 2024-09-03 | Stop reason: HOSPADM

## 2024-09-03 RX ORDER — TRIAMCINOLONE ACETONIDE 40 MG/ML
40 INJECTION, SUSPENSION INTRA-ARTICULAR; INTRAMUSCULAR
Status: DISCONTINUED | OUTPATIENT
Start: 2024-09-03 | End: 2024-09-03 | Stop reason: HOSPADM

## 2024-09-03 RX ADMIN — TRIAMCINOLONE ACETONIDE 40 MG: 40 INJECTION, SUSPENSION INTRA-ARTICULAR; INTRAMUSCULAR at 11:09

## 2024-09-03 RX ADMIN — LIDOCAINE HYDROCHLORIDE 2 ML: 10 INJECTION, SOLUTION EPIDURAL; INFILTRATION; INTRACAUDAL; PERINEURAL at 11:09

## 2024-09-03 NOTE — PROGRESS NOTES
9/3/2024    10:49 AM   Rapid3 Question Responses and Scores   MDHAQ Score 0.6   Psychologic Score 3.3   Pain Score 6   When you awakened in the morning OVER THE LAST WEEK, did you feel stiff? Yes   If Yes, please indicate the number of hours until you are as limber as you will be for the day 2   Fatigue Score 3.5   Global Health Score 2   RAPID3 Score 3.33

## 2024-09-03 NOTE — PROCEDURES
Intermediate Joint Aspiration/Injection: R radiocarpal    Date/Time: 9/3/2024 11:00 AM    Performed by: Oriana Barber MD  Authorized by: Oriana Barber MD    Consent Done?:  Yes (Written)  Indications:  Joint swelling and pain    Location:  Wrist  Site:  R radiocarpal  Ultrasonic Guidance for needle placement: No  Needle size:  25 G  Approach:  Dorsal  Medications:  2 mL LIDOcaine (PF) 10 mg/ml (1%) 10 mg/mL (1 %); 40 mg triamcinolone acetonide 40 mg/mL  Patient tolerance:  Patient tolerated the procedure well with no immediate complications

## 2024-09-03 NOTE — PROGRESS NOTES
RHEUMATOLOGY OUTPATIENT CLINIC NOTE    9/3/2024    Attending Rheumatologist: Oriana Barber  Primary Care Provider: No, Primary Doctor   Physician Requesting Consultation: No referring provider defined for this encounter.  Chief Complaint/Reason For Consultation:  Rheumatoid arthritis involving multiple sites with positive    Subjective:       HPI  Rhea Sandra is a 39 y.o. Black or  female who comes for evaluation of RA.    Interim history  -comes today for right wrist CSI due to severe pain and swelling that prevents her from doing her job   -she is taking enbrel, HCQ and prednisone.     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 Then delivered her child in Feb 2023 and a couple of weeks later, she developed feet, hand, wrist, shoulder pain. Also had pain in left hip. Unable to make a fist. She was evaluated by Dr. Craig last year. Started on  mg BID and Cimzia 200 mg every 2 weeks (she is breastfeeding). This combination of medications helped significantly with her joint pain and swelling. She ran out of medications about 4 months ago. She has been in the emergency room 3 times with flares of RA which improve with steroid.   She is still breastfeeding now.     She was also taking vitamin D once a week.     Labs  7/2023  QuantiFERON negative  Hepatitis B and C serologies negative  HIV negative    6/2023  GABO negative    RF positive  SSA, SSB positive   ACE normal  IL 2 receptor negative  Anticardiolipin, beta 2 glycoprotein, lupus anticoagulant negative  CRP 15.7  ESR 34  CMP with normal kidney and liver function  CBC hemoglobin 11.5, WBC 4.2, platelets 303  Vitamin D 27     Review of Systems   Constitutional:  Negative for fever and unexpected weight change.   HENT:  Negative for mouth sores and trouble swallowing.    Eyes:  Negative for redness.   Respiratory:  Negative for cough and shortness of breath.     Cardiovascular:  Negative for chest pain.   Gastrointestinal:  Positive for diarrhea. Negative for constipation.   Genitourinary:  Negative for dysuria and genital sores.   Integumentary:  Negative for rash.   Neurological:  Negative for headaches.   Hematological:  Does not bruise/bleed easily.        Chronic comorbid conditions affecting medical decision making today:  Past Medical History:   Diagnosis Date    Rheumatoid arthritis, unspecified     Sjogren's disease      Past Surgical History:   Procedure Laterality Date    DILATION AND CURETTAGE OF UTERUS       No family history on file.  Social History     Substance and Sexual Activity   Alcohol Use Yes    Comment: occassionally     Social History     Tobacco Use   Smoking Status Never   Smokeless Tobacco Not on file     Social History     Substance and Sexual Activity   Drug Use Not on file       Current Outpatient Medications:     cholecalciferol, vitamin D3, 1,250 mcg (50,000 unit) Tab, Take 1,250 mcg by mouth every 7 days., Disp: 12 tablet, Rfl: 2    etanercept (ENBREL SURECLICK) 50 mg/mL (1 mL), Inject 1 mL (50 mg total) into the skin once a week., Disp: 4 mL, Rfl: 11    hydroxychloroquine (PLAQUENIL) 200 mg tablet, Take 2 tablets (400 mg total) by mouth once daily., Disp: 60 tablet, Rfl: 11    predniSONE (DELTASONE) 5 MG tablet, Take 1 tablet (5 mg total) by mouth once daily., Disp: 30 tablet, Rfl: 1    prenatal vit,rafi 74-iron-folic 27 mg iron- 1 mg Tab, Take by mouth., Disp: , Rfl:      Objective:         Vitals:    09/03/24 1051   BP: 126/84   Pulse: 77         Physical Exam   Constitutional: She is oriented to person, place, and time. She appears well-developed.   HENT:   Head: Normocephalic.   Mouth/Throat: Mucous membranes are moist.   Pulmonary/Chest: Effort normal.   Abdominal: Soft.   Musculoskeletal:      Cervical back: Neck supple.      Comments: Cspine FROM no tenderness  Tspine FROM no tenderness  Lspine FROM no tenderness.  Shoulders: FROM;  "no synovitis;  Elbows: FROM; no synovitis; no tophi. Right elbow nodule.   Wrists: right wrist with synovitis.   MCPs: FROM; no synovitis, tenderness in multiple MCPs  PIPs:FROM; no synovitis;   DIPs: FROM; no synovitis;   HIPS: FROM  Knees: FROM; no synovitis; no instability  Ankles: right ankle with swelling and tenderness.   Toes: no tenderness on palpation.     Lymphadenopathy:     She has no cervical adenopathy.   Neurological: She is alert and oriented to person, place, and time.   Skin: No rash noted.   Vitals reviewed.    CDAI Score: --        Reviewed old and all outside pertinent medical records available.    All lab results personally reviewed and interpreted by me.  Lab Results   Component Value Date    WBC 7.24 08/12/2024    HGB 11.4 (L) 08/12/2024    HCT 35.4 (L) 08/12/2024    MCV 90 08/12/2024    MCH 29.0 08/12/2024    MCHC 32.2 08/12/2024    RDW 13.2 08/12/2024     08/12/2024    MPV 12.9 08/12/2024    PLTEST Adequate 05/01/2009       Lab Results   Component Value Date     08/12/2024    K 3.7 08/12/2024     08/12/2024    CO2 23 08/12/2024    GLU 76 08/12/2024    BUN 15 08/12/2024    CALCIUM 9.2 08/12/2024    PROT 8.2 08/12/2024    ALBUMIN 3.5 08/12/2024    BILITOT 0.2 08/12/2024    AST 31 08/12/2024    ALKPHOS 47 (L) 08/12/2024    ALT 42 08/12/2024       Lab Results   Component Value Date    COLORU Yellow 02/27/2023    APPEARANCEUA Clear 12/27/2021    SPECGRAV 1.010 12/27/2021    PHUR 6.0 12/27/2021    PROTEINUA Negative 12/27/2021    KETONESU Negative 12/27/2021    LEUKOCYTESUR Negative 02/27/2023    NITRITE Negative 12/27/2021    UROBILINOGEN 0.2 02/27/2023       Lab Results   Component Value Date    CRP 2.0 08/12/2024       Lab Results   Component Value Date    SEDRATE 44 (H) 08/12/2024       No components found for: "25OHVITDTOT", "59TPXFKU4", "45NBKAGF5", "METHODNOTE"    No results found for: "URICACID"    Lab Results   Component Value Date    HEPBSAB <3.00 04/22/2024    " HEPBSAB Non-reactive 04/22/2024    HEPBSAG Non-reactive 04/22/2024    HEPCAB Non-reactive 04/22/2024       Imaging:  All imaging reviewed and independently interpreted by me.     ASSESSMENT / PLAN:     Rhea Sandra is a 39 y.o. Black or  female with:    Right wrist swelling  -likely related to active RA.   -will proceed with right wrist CSI. See procedure note. Post injection care discussed with patient.   -if no improvement, may consider MRI of the wrist to evaluate for other causes of pain. Discussed with patient that OA can play a role too as this is her dominant hand.     Seropositive, nodular rheumatoid arthritis   -RF + / CCP +. Elevated ESR   -right elbow rheumatoid nodule  -had been on Cimzia 200 mg SC every 2 weeks since April 2024, however symptoms have worsened and are uncontrolled with Cimzia.  -started Enbrel 50 mg SC QW on late August . Tolerated well.   -continue  mg BID. Advised her to establish care with ophthalmology   -avoid MTX/LEF due to child bearing age and no reliable birth control method  -continue prednisone 5 mg daily.     Follow up in about 6 weeks (around 10/15/2024).    Method of contact with patient concerns: [facility]    Disclaimer:  This note is prepared using voice recognition software and as such is likely to have errors and has not been proof read. Please contact me for questions.     Time spent: 20 minutes in face to face discussion concerning diagnosis, prognosis, review of lab and test results, benefits of treatment as well as management of disease, counseling of patient and coordination of care between various health care providers.  Greater than half the time spent was used for coordination of care and counseling of patient.    Oriana Barber M.D.  Rheumatology  [facility]

## 2024-09-06 ENCOUNTER — PATIENT MESSAGE (OUTPATIENT)
Dept: ADMINISTRATIVE | Facility: OTHER | Age: 39
End: 2024-09-06
Payer: MEDICAID

## 2024-09-30 ENCOUNTER — PATIENT MESSAGE (OUTPATIENT)
Dept: ADMINISTRATIVE | Facility: OTHER | Age: 39
End: 2024-09-30
Payer: MEDICAID

## 2024-10-25 ENCOUNTER — OFFICE VISIT (OUTPATIENT)
Dept: RHEUMATOLOGY | Facility: CLINIC | Age: 39
End: 2024-10-25
Payer: MEDICAID

## 2024-10-25 VITALS
BODY MASS INDEX: 31.79 KG/M2 | DIASTOLIC BLOOD PRESSURE: 79 MMHG | SYSTOLIC BLOOD PRESSURE: 119 MMHG | HEART RATE: 85 BPM | WEIGHT: 157.44 LBS

## 2024-10-25 DIAGNOSIS — Z79.899 DRUG-INDUCED IMMUNODEFICIENCY: ICD-10-CM

## 2024-10-25 DIAGNOSIS — M25.431 PAIN AND SWELLING OF RIGHT WRIST: ICD-10-CM

## 2024-10-25 DIAGNOSIS — D84.821 DRUG-INDUCED IMMUNODEFICIENCY: ICD-10-CM

## 2024-10-25 DIAGNOSIS — M25.531 PAIN AND SWELLING OF RIGHT WRIST: ICD-10-CM

## 2024-10-25 DIAGNOSIS — M05.79 RHEUMATOID ARTHRITIS INVOLVING MULTIPLE SITES WITH POSITIVE RHEUMATOID FACTOR: Primary | ICD-10-CM

## 2024-10-25 DIAGNOSIS — M35.00 SJOGREN'S SYNDROME, WITH UNSPECIFIED ORGAN INVOLVEMENT: ICD-10-CM

## 2024-10-25 PROCEDURE — 99999 PR PBB SHADOW E&M-EST. PATIENT-LVL III: CPT | Mod: PBBFAC,,, | Performed by: STUDENT IN AN ORGANIZED HEALTH CARE EDUCATION/TRAINING PROGRAM

## 2024-10-25 PROCEDURE — 99213 OFFICE O/P EST LOW 20 MIN: CPT | Mod: PBBFAC,PN | Performed by: STUDENT IN AN ORGANIZED HEALTH CARE EDUCATION/TRAINING PROGRAM

## 2024-10-25 RX ORDER — HYDROXYCHLOROQUINE SULFATE 200 MG/1
400 TABLET, FILM COATED ORAL DAILY
Qty: 60 TABLET | Refills: 11 | Status: SHIPPED | OUTPATIENT
Start: 2024-10-25 | End: 2025-10-25

## 2024-10-25 RX ORDER — PREDNISONE 5 MG/1
5 TABLET ORAL DAILY
COMMUNITY
Start: 2024-10-04 | End: 2024-10-25 | Stop reason: SDUPTHER

## 2024-10-25 RX ORDER — PREDNISONE 5 MG/1
TABLET ORAL
Qty: 205 TABLET | Refills: 0 | Status: SHIPPED | OUTPATIENT
Start: 2024-10-25 | End: 2025-02-02

## 2024-10-25 NOTE — PROGRESS NOTES
RHEUMATOLOGY OUTPATIENT CLINIC NOTE    10/25/2024    Attending Rheumatologist: Oriana Barber  Primary Care Provider: No, Primary Doctor   Physician Requesting Consultation: No referring provider defined for this encounter.  Chief Complaint/Reason For Consultation:  Rheumatoid arthritis involving multiple sites with positive    Subjective:       HPI  Rhea Sandra is a 39 y.o. Black or  female who comes for evaluation of RA.    Interim history  -Last visit on 9/2024  -She had right wrist CSI on 9/2024 which she reports it helped only 3 weeks. She is having issues with limited range of motion of the right wrist and also noted intermittent left elbow pain and unable to fully extend it  -she feels that enbrel has helped but not 100%.   -she takes  mg BID but admits sometimes she forgets to take one dose.   -she takes prednisone 5 mg daily.     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 Then delivered her child in Feb 2023 and a couple of weeks later, she developed feet, hand, wrist, shoulder pain. Also had pain in left hip. Unable to make a fist. She was evaluated by Dr. Craig last year. Started on  mg BID and Cimzia 200 mg every 2 weeks (she is breastfeeding). This combination of medications helped significantly with her joint pain and swelling. She ran out of medications about 4 months ago. She has been in the emergency room 3 times with flares of RA which improve with steroid.   She is still breastfeeding now.     She was also taking vitamin D once a week.     Labs  7/2023  QuantiFERON negative  Hepatitis B and C serologies negative  HIV negative    6/2023  GABO negative    RF positive  SSA, SSB positive   ACE normal  IL 2 receptor negative  Anticardiolipin, beta 2 glycoprotein, lupus anticoagulant negative  CRP 15.7  ESR 34  CMP with normal kidney and liver function  CBC hemoglobin 11.5, WBC 4.2, platelets  303  Vitamin D 27     Review of Systems   Constitutional:  Negative for fever and unexpected weight change.   HENT:  Negative for mouth sores and trouble swallowing.    Eyes:  Negative for redness.   Respiratory:  Negative for cough and shortness of breath.    Cardiovascular:  Negative for chest pain.   Gastrointestinal:  Positive for diarrhea. Negative for constipation.   Genitourinary:  Negative for dysuria and genital sores.   Integumentary:  Negative for rash.   Neurological:  Negative for headaches.   Hematological:  Does not bruise/bleed easily.        Chronic comorbid conditions affecting medical decision making today:  Past Medical History:   Diagnosis Date    Rheumatoid arthritis, unspecified     Sjogren's disease      Past Surgical History:   Procedure Laterality Date    DILATION AND CURETTAGE OF UTERUS       No family history on file.  Social History     Substance and Sexual Activity   Alcohol Use Yes    Comment: occassionally     Social History     Tobacco Use   Smoking Status Never   Smokeless Tobacco Not on file     Social History     Substance and Sexual Activity   Drug Use Not on file       Current Outpatient Medications:     cholecalciferol, vitamin D3, 1,250 mcg (50,000 unit) Tab, Take 1,250 mcg by mouth every 7 days., Disp: 12 tablet, Rfl: 2    etanercept (ENBREL SURECLICK) 50 mg/mL (1 mL), Inject 1 mL (50 mg total) into the skin once a week., Disp: 4 mL, Rfl: 11    prenatal vit,rafi 74-iron-folic 27 mg iron- 1 mg Tab, Take by mouth., Disp: , Rfl:     hydroxychloroquine (PLAQUENIL) 200 mg tablet, Take 2 tablets (400 mg total) by mouth once daily., Disp: 60 tablet, Rfl: 11    predniSONE (DELTASONE) 5 MG tablet, Take 3 tablets (15 mg total) by mouth once daily for 5 days, THEN 2 tablets (10 mg total) once daily for 5 days, THEN 2 tablets (10 mg total) once daily., Disp: 205 tablet, Rfl: 0     Objective:         Vitals:    10/25/24 1002   BP: 119/79   Pulse: 85         Physical Exam   Constitutional:  She is oriented to person, place, and time. She appears well-developed.   HENT:   Head: Normocephalic.   Mouth/Throat: Mucous membranes are moist.   Pulmonary/Chest: Effort normal.   Abdominal: Soft.   Musculoskeletal:      Cervical back: Neck supple.      Comments: Cspine FROM no tenderness  Tspine FROM no tenderness  Lspine FROM no tenderness.  Shoulders: FROM; no synovitis;  Elbows: right elbow with normal ROM, previous nodule is resolved. Left elbow without tenderness but flexion contracture.   Wrists: right wrist with no ROM, unable to flex or extend wrist, mild tenderness and swelling.   MCPs: FROM; no synovitis, tenderness in multiple MCPs  PIPs:FROM; no synovitis;   DIPs: FROM; no synovitis;   HIPS: FROM  Knees: FROM; no synovitis; no instability  Ankles: right ankle with swelling and tenderness.   Toes: no tenderness on palpation.     Lymphadenopathy:     She has no cervical adenopathy.   Neurological: She is alert and oriented to person, place, and time.   Skin: No rash noted.   Vitals reviewed.    CDAI Score: 9 / 76         Reviewed old and all outside pertinent medical records available.    All lab results personally reviewed and interpreted by me.  Lab Results   Component Value Date    WBC 7.24 08/12/2024    HGB 11.4 (L) 08/12/2024    HCT 35.4 (L) 08/12/2024    MCV 90 08/12/2024    MCH 29.0 08/12/2024    MCHC 32.2 08/12/2024    RDW 13.2 08/12/2024     08/12/2024    MPV 12.9 08/12/2024    PLTEST Adequate 05/01/2009       Lab Results   Component Value Date     08/12/2024    K 3.7 08/12/2024     08/12/2024    CO2 23 08/12/2024    GLU 76 08/12/2024    BUN 15 08/12/2024    CALCIUM 9.2 08/12/2024    PROT 8.2 08/12/2024    ALBUMIN 3.5 08/12/2024    BILITOT 0.2 08/12/2024    AST 31 08/12/2024    ALKPHOS 47 (L) 08/12/2024    ALT 42 08/12/2024       Lab Results   Component Value Date    COLORU Yellow 02/27/2023    APPEARANCEUA Clear 12/27/2021    SPECGRAV 1.010 12/27/2021    PHUR 6.0 12/27/2021  "   PROTEINUA Negative 12/27/2021    KETONESU Negative 12/27/2021    LEUKOCYTESUR Negative 02/27/2023    NITRITE Negative 12/27/2021    UROBILINOGEN 0.2 02/27/2023       Lab Results   Component Value Date    CRP 2.0 08/12/2024       Lab Results   Component Value Date    SEDRATE 44 (H) 08/12/2024       No components found for: "25OHVITDTOT", "03XJNOQF4", "04JMZXKF1", "METHODNOTE"    No results found for: "URICACID"    Lab Results   Component Value Date    HEPBSAB <3.00 04/22/2024    HEPBSAB Non-reactive 04/22/2024    HEPBSAG Non-reactive 04/22/2024    HEPCAB Non-reactive 04/22/2024       Imaging:  All imaging reviewed and independently interpreted by me.     ASSESSMENT / PLAN:     Rhea Sandra is a 39 y.o. Black or  female with:    Seropositive, nodular rheumatoid arthritis   -RF + / CCP +. Elevated ESR   -right elbow rheumatoid nodule that resolved but now has severely limited ROM in right wrist and left elbow flexion contracture.   -had been on Cimzia 200 mg SC every 2 weeks since April 2024, however symptoms have worsened and are uncontrolled with Cimzia.  -continue Enbrel 50 mg SC QW - started in late August  -continue  mg BID. Advised her to establish care with ophthalmology   -disease activity has improved overall but she is having severely limited ROM in right wrist and left elbow flexion contracture. Discussed about starting MTX. She is no interested on hormonal birth control or IUD. She is interested on using condoms at all times. We discussed risk and benefits. She should not get pregnant on MTX. Provided handout and will consider it in a couple of weeks. She will let me know   -may consider also stopping enbrel and using Ajay such as rinvoq.   -due to above, start prednisone taper 15 mg for 5 days then 10 mg for 5 days then stay on 5 mg daily     Right wrist swelling  -likely related to active RA.   -s/p right wrist CSI on 9/2024 that lasted 3 weeks.   -obtain XR of wrist " now  -May consider MRI of the wrist to evaluate for other causes of pain as she has severely limited ROM in right wrist.      3. Drug induced immunodeficiency  - recent labs reviewed  - no live vaccines  - vaccines per guidelines   - immunosuppression/infectious precautions reinforced       4. Sjogren's syndrome, with unspecified organ involvement  -SSA + / SSB +  -negative GABO  -asymptomatic  -could be secondary to RA.      5. Vitamin D deficiency, resolved   -17 > 44  -Continue daily vitamin D supplement     6. Counseling and coordination of care  -referral to PCP placed last visit. She will see them on 12/4    Follow up in about 2 months (around 12/25/2024).    Method of contact with patient concerns: [facility]    Disclaimer:  This note is prepared using voice recognition software and as such is likely to have errors and has not been proof read. Please contact me for questions.     Time spent: 25 minutes in face to face discussion concerning diagnosis, prognosis, review of lab and test results, benefits of treatment as well as management of disease, counseling of patient and coordination of care between various health care providers.      Oriana Barber M.D.  Rheumatology  [facility]

## 2024-10-25 NOTE — PROGRESS NOTES
10/24/2024     5:25 PM   Rapid3 Question Responses and Scores   MDHAQ Score 0.2   Psychologic Score 1.1   Pain Score 4.5   When you awakened in the morning OVER THE LAST WEEK, did you feel stiff? Yes   If Yes, please indicate the number of hours until you are as limber as you will be for the day 3   Fatigue Score 6   Global Health Score 3   RAPID3 Score 2.72

## 2024-10-27 ENCOUNTER — HOSPITAL ENCOUNTER (EMERGENCY)
Facility: HOSPITAL | Age: 39
Discharge: HOME | End: 2024-10-27
Attending: EMERGENCY MEDICINE
Payer: MEDICAID

## 2024-10-27 VITALS
DIASTOLIC BLOOD PRESSURE: 80 MMHG | WEIGHT: 155 LBS | TEMPERATURE: 99 F | HEART RATE: 81 BPM | RESPIRATION RATE: 18 BRPM | HEIGHT: 59 IN | BODY MASS INDEX: 31.25 KG/M2 | OXYGEN SATURATION: 98 % | SYSTOLIC BLOOD PRESSURE: 118 MMHG

## 2024-10-27 DIAGNOSIS — M06.9 RHEUMATOID ARTHRITIS FLARE: Primary | ICD-10-CM

## 2024-10-27 DIAGNOSIS — M25.531 PAIN OF BOTH WRIST JOINTS: ICD-10-CM

## 2024-10-27 DIAGNOSIS — M25.532 PAIN OF BOTH WRIST JOINTS: ICD-10-CM

## 2024-10-27 LAB
B-HCG UR QL: NEGATIVE
CTP QC/QA: YES

## 2024-10-27 PROCEDURE — 25000003 PHARM REV CODE 250

## 2024-10-27 PROCEDURE — 63600175 PHARM REV CODE 636 W HCPCS

## 2024-10-27 PROCEDURE — 81025 URINE PREGNANCY TEST: CPT

## 2024-10-27 PROCEDURE — 99284 EMERGENCY DEPT VISIT MOD MDM: CPT | Mod: 25

## 2024-10-27 PROCEDURE — 96372 THER/PROPH/DIAG INJ SC/IM: CPT

## 2024-10-27 RX ORDER — METHOCARBAMOL 750 MG/1
750 TABLET, FILM COATED ORAL
Status: COMPLETED | OUTPATIENT
Start: 2024-10-27 | End: 2024-10-27

## 2024-10-27 RX ORDER — DEXTROMETHORPHAN HYDROBROMIDE, GUAIFENESIN 5; 100 MG/5ML; MG/5ML
650 LIQUID ORAL EVERY 8 HOURS
Qty: 21 TABLET | Refills: 0 | Status: SHIPPED | OUTPATIENT
Start: 2024-10-27 | End: 2024-11-03

## 2024-10-27 RX ORDER — KETOROLAC TROMETHAMINE 30 MG/ML
30 INJECTION, SOLUTION INTRAMUSCULAR; INTRAVENOUS
Status: COMPLETED | OUTPATIENT
Start: 2024-10-27 | End: 2024-10-27

## 2024-10-27 RX ORDER — DEXAMETHASONE SODIUM PHOSPHATE 4 MG/ML
10 INJECTION, SOLUTION INTRA-ARTICULAR; INTRALESIONAL; INTRAMUSCULAR; INTRAVENOUS; SOFT TISSUE
Status: COMPLETED | OUTPATIENT
Start: 2024-10-27 | End: 2024-10-27

## 2024-10-27 RX ORDER — PREDNISONE 20 MG/1
40 TABLET ORAL DAILY
Qty: 10 TABLET | Refills: 0 | Status: SHIPPED | OUTPATIENT
Start: 2024-10-27 | End: 2024-11-01

## 2024-10-27 RX ORDER — NAPROXEN 500 MG/1
500 TABLET ORAL 2 TIMES DAILY
Qty: 14 TABLET | Refills: 0 | Status: SHIPPED | OUTPATIENT
Start: 2024-10-27 | End: 2024-11-03

## 2024-10-27 RX ADMIN — DEXAMETHASONE SODIUM PHOSPHATE 10 MG: 4 INJECTION, SOLUTION INTRA-ARTICULAR; INTRALESIONAL; INTRAMUSCULAR; INTRAVENOUS; SOFT TISSUE at 09:10

## 2024-10-27 RX ADMIN — KETOROLAC TROMETHAMINE 30 MG: 30 INJECTION, SOLUTION INTRAMUSCULAR; INTRAVENOUS at 09:10

## 2024-10-27 RX ADMIN — METHOCARBAMOL TABLETS 750 MG: 750 TABLET, COATED ORAL at 09:10

## 2024-11-07 DIAGNOSIS — M05.79 RHEUMATOID ARTHRITIS INVOLVING MULTIPLE SITES WITH POSITIVE RHEUMATOID FACTOR: Primary | ICD-10-CM

## 2024-11-07 DIAGNOSIS — M25.531 PAIN AND SWELLING OF RIGHT WRIST: ICD-10-CM

## 2024-11-07 DIAGNOSIS — M25.431 PAIN AND SWELLING OF RIGHT WRIST: ICD-10-CM

## 2024-11-07 NOTE — PROGRESS NOTES
Reviewed XR of the right wrist. There are significant degenerative changes in the radiocarpal joints. She has severe limited ROM on examination with swelling.   Symptoms did not improve with right wrist steroid injection.   Will proceed with MRI of the right wrist/hand w/wo contrast to evaluate for occult synovitis or other structural abnormalities that could guide management of rheumatoid arthritis

## 2024-11-11 ENCOUNTER — TELEPHONE (OUTPATIENT)
Dept: RHEUMATOLOGY | Facility: CLINIC | Age: 39
End: 2024-11-11
Payer: MEDICAID

## 2024-11-11 NOTE — TELEPHONE ENCOUNTER
----- Message from Oriana Barber MD sent at 11/7/2024  3:34 PM CST -----  Please call the patient about results. The XR of the wrists show signs of regular arthritis but I want to get an MRI of the right hand and wrist for further evaluation because of the swelling. I placed the order for the MRI, please schedule it for 10-15 days from now

## 2024-12-03 ENCOUNTER — TELEPHONE (OUTPATIENT)
Dept: RHEUMATOLOGY | Facility: CLINIC | Age: 39
End: 2024-12-03
Payer: MEDICAID

## 2024-12-03 DIAGNOSIS — M05.79 RHEUMATOID ARTHRITIS INVOLVING MULTIPLE SITES WITH POSITIVE RHEUMATOID FACTOR: Primary | ICD-10-CM

## 2024-12-03 RX ORDER — METHOTREXATE 2.5 MG/1
TABLET ORAL
Qty: 69 TABLET | Refills: 0 | Status: SHIPPED | OUTPATIENT
Start: 2024-12-03

## 2024-12-03 RX ORDER — FOLIC ACID 1 MG/1
1 TABLET ORAL DAILY
Qty: 90 TABLET | Refills: 3 | Status: SHIPPED | OUTPATIENT
Start: 2024-12-03 | End: 2025-12-03

## 2024-12-03 NOTE — TELEPHONE ENCOUNTER
Patient called to let Dr. Armenta know that she would like to start the Methotrexate.     With starting MTX she wants to know if she is able to take semaglutide shot while being on it.    Also wants to know if imaging can be ordered for her R elbow XR/MRI. She states the fall that she discussed last office visit with you, her R elbow took the impact of that fall and its been still hurting a good bit.

## 2024-12-09 ENCOUNTER — HOSPITAL ENCOUNTER (OUTPATIENT)
Dept: RADIOLOGY | Facility: HOSPITAL | Age: 39
Discharge: HOME | End: 2024-12-09
Attending: STUDENT IN AN ORGANIZED HEALTH CARE EDUCATION/TRAINING PROGRAM
Payer: MEDICAID

## 2024-12-09 DIAGNOSIS — M05.79 RHEUMATOID ARTHRITIS INVOLVING MULTIPLE SITES WITH POSITIVE RHEUMATOID FACTOR: ICD-10-CM

## 2024-12-09 DIAGNOSIS — M25.431 PAIN AND SWELLING OF RIGHT WRIST: ICD-10-CM

## 2024-12-09 DIAGNOSIS — M25.531 PAIN AND SWELLING OF RIGHT WRIST: ICD-10-CM

## 2024-12-09 PROCEDURE — 25500020 PHARM REV CODE 255: Performed by: STUDENT IN AN ORGANIZED HEALTH CARE EDUCATION/TRAINING PROGRAM

## 2024-12-09 PROCEDURE — 73220 MRI UPPR EXTREMITY W/O&W/DYE: CPT | Mod: 26,50,, | Performed by: RADIOLOGY

## 2024-12-09 PROCEDURE — A9585 GADOBUTROL INJECTION: HCPCS | Performed by: STUDENT IN AN ORGANIZED HEALTH CARE EDUCATION/TRAINING PROGRAM

## 2024-12-09 PROCEDURE — 73223 MRI JOINT UPR EXTR W/O&W/DYE: CPT | Mod: 26,50,, | Performed by: RADIOLOGY

## 2024-12-09 PROCEDURE — 73223 MRI JOINT UPR EXTR W/O&W/DYE: CPT | Mod: TC,50

## 2024-12-09 PROCEDURE — 73223 MRI JOINT UPR EXTR W/O&W/DYE: CPT | Mod: TC,RT

## 2024-12-09 RX ORDER — GADOBUTROL 604.72 MG/ML
7 INJECTION INTRAVENOUS
Status: COMPLETED | OUTPATIENT
Start: 2024-12-09 | End: 2024-12-09

## 2024-12-09 RX ADMIN — GADOBUTROL 7 ML: 604.72 INJECTION INTRAVENOUS at 08:12

## 2024-12-16 ENCOUNTER — OFFICE VISIT (OUTPATIENT)
Dept: RHEUMATOLOGY | Facility: CLINIC | Age: 39
End: 2024-12-16
Payer: MEDICAID

## 2024-12-16 VITALS
SYSTOLIC BLOOD PRESSURE: 132 MMHG | HEIGHT: 59 IN | BODY MASS INDEX: 31.34 KG/M2 | HEART RATE: 82 BPM | WEIGHT: 155.44 LBS | DIASTOLIC BLOOD PRESSURE: 84 MMHG

## 2024-12-16 DIAGNOSIS — M05.79 RHEUMATOID ARTHRITIS INVOLVING MULTIPLE SITES WITH POSITIVE RHEUMATOID FACTOR: ICD-10-CM

## 2024-12-16 DIAGNOSIS — M25.531 PAIN AND SWELLING OF RIGHT WRIST: ICD-10-CM

## 2024-12-16 DIAGNOSIS — E55.9 VITAMIN D DEFICIENCY: ICD-10-CM

## 2024-12-16 DIAGNOSIS — D84.821 DRUG-INDUCED IMMUNODEFICIENCY: Primary | ICD-10-CM

## 2024-12-16 DIAGNOSIS — Z71.89 COUNSELING AND COORDINATION OF CARE: ICD-10-CM

## 2024-12-16 DIAGNOSIS — Z79.899 DRUG-INDUCED IMMUNODEFICIENCY: Primary | ICD-10-CM

## 2024-12-16 DIAGNOSIS — M35.00 SJOGREN'S SYNDROME, WITH UNSPECIFIED ORGAN INVOLVEMENT: ICD-10-CM

## 2024-12-16 DIAGNOSIS — M25.431 PAIN AND SWELLING OF RIGHT WRIST: ICD-10-CM

## 2024-12-16 PROCEDURE — 3079F DIAST BP 80-89 MM HG: CPT | Mod: CPTII,,, | Performed by: STUDENT IN AN ORGANIZED HEALTH CARE EDUCATION/TRAINING PROGRAM

## 2024-12-16 PROCEDURE — 99213 OFFICE O/P EST LOW 20 MIN: CPT | Mod: PBBFAC,PN | Performed by: STUDENT IN AN ORGANIZED HEALTH CARE EDUCATION/TRAINING PROGRAM

## 2024-12-16 PROCEDURE — G2211 COMPLEX E/M VISIT ADD ON: HCPCS | Mod: S$PBB,,, | Performed by: STUDENT IN AN ORGANIZED HEALTH CARE EDUCATION/TRAINING PROGRAM

## 2024-12-16 PROCEDURE — 3075F SYST BP GE 130 - 139MM HG: CPT | Mod: CPTII,,, | Performed by: STUDENT IN AN ORGANIZED HEALTH CARE EDUCATION/TRAINING PROGRAM

## 2024-12-16 PROCEDURE — 99215 OFFICE O/P EST HI 40 MIN: CPT | Mod: S$PBB,,, | Performed by: STUDENT IN AN ORGANIZED HEALTH CARE EDUCATION/TRAINING PROGRAM

## 2024-12-16 PROCEDURE — 1159F MED LIST DOCD IN RCRD: CPT | Mod: CPTII,,, | Performed by: STUDENT IN AN ORGANIZED HEALTH CARE EDUCATION/TRAINING PROGRAM

## 2024-12-16 PROCEDURE — 99999 PR PBB SHADOW E&M-EST. PATIENT-LVL III: CPT | Mod: PBBFAC,,, | Performed by: STUDENT IN AN ORGANIZED HEALTH CARE EDUCATION/TRAINING PROGRAM

## 2024-12-16 PROCEDURE — 3008F BODY MASS INDEX DOCD: CPT | Mod: CPTII,,, | Performed by: STUDENT IN AN ORGANIZED HEALTH CARE EDUCATION/TRAINING PROGRAM

## 2024-12-16 RX ORDER — MULTIVITAMIN WITH IRON
1 TABLET ORAL DAILY
COMMUNITY

## 2024-12-16 NOTE — PROGRESS NOTES
RHEUMATOLOGY OUTPATIENT CLINIC NOTE    12/16/2024    Attending Rheumatologist: Oriana Barber  Primary Care Provider: No, Primary Doctor   Physician Requesting Consultation: No referring provider defined for this encounter.  Chief Complaint/Reason For Consultation:  Rheumatoid arthritis involving multiple sites with positive    Subjective:       HPI  Rhea Sandra is a 39 y.o. Black or  female who comes for evaluation of RA.    Interim history  -last visit on 10/2024  -recent labs showed elevated CRP. ESR >120. CMP and CBC stable.   -MRI of bilateral hands with and without contrast showed Advanced proximal inflammatory arthritic changes affecting the distal radioulnar joint, ulnocarpal joint and proximal carpal row.   -current medications: enbrel 50 mg SC QW,  mg BID.  We added methotrexate 2 weeks ago which she had tolerated well with exception of mild fatigue.   -She is having issues with limited range of motion of the right wrist and also noted intermittent left elbow pain and unable to fully extend it  -she feels that enbrel has helped but not 100%.   -she takes prednisone 5 mg daily.     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 Then delivered her child in Feb 2023 and a couple of weeks later, she developed feet, hand, wrist, shoulder pain. Also had pain in left hip. Unable to make a fist. She was evaluated by Dr. Craig last year. Started on  mg BID and Cimzia 200 mg every 2 weeks (she is breastfeeding). This combination of medications helped significantly with her joint pain and swelling. She ran out of medications about 4 months ago. She has been in the emergency room 3 times with flares of RA which improve with steroid.   She is still breastfeeding now.     She was also taking vitamin D once a week.     Labs  7/2023  QuantiFERON negative  Hepatitis B and C serologies negative  HIV negative    6/2023  GABO  negative    RF positive  SSA, SSB positive   ACE normal  IL 2 receptor negative  Anticardiolipin, beta 2 glycoprotein, lupus anticoagulant negative  CRP 15.7  ESR 34  CMP with normal kidney and liver function  CBC hemoglobin 11.5, WBC 4.2, platelets 303  Vitamin D 27     Review of Systems   Constitutional:  Negative for fever and unexpected weight change.   HENT:  Negative for mouth sores and trouble swallowing.    Eyes:  Negative for redness.   Respiratory:  Negative for cough and shortness of breath.    Cardiovascular:  Negative for chest pain.   Gastrointestinal:  Positive for diarrhea. Negative for constipation.   Genitourinary:  Negative for dysuria and genital sores.   Integumentary:  Negative for rash.   Neurological:  Negative for headaches.   Hematological:  Does not bruise/bleed easily.        Chronic comorbid conditions affecting medical decision making today:  Past Medical History:   Diagnosis Date    Rheumatoid arthritis, unspecified     Sjogren's disease      Past Surgical History:   Procedure Laterality Date    DILATION AND CURETTAGE OF UTERUS       No family history on file.  Social History     Substance and Sexual Activity   Alcohol Use Yes    Comment: occassionally     Social History     Tobacco Use   Smoking Status Never   Smokeless Tobacco Not on file     Social History     Substance and Sexual Activity   Drug Use Not on file       Current Outpatient Medications:     cholecalciferol, vitamin D3, 1,250 mcg (50,000 unit) Tab, Take 1,250 mcg by mouth every 7 days., Disp: 12 tablet, Rfl: 2    etanercept (ENBREL SURECLICK) 50 mg/mL (1 mL), Inject 1 mL (50 mg total) into the skin once a week., Disp: 4 mL, Rfl: 11    folic acid (FOLVITE) 1 MG tablet, Take 1 tablet (1 mg total) by mouth once daily., Disp: 90 tablet, Rfl: 3    hydroxychloroquine (PLAQUENIL) 200 mg tablet, Take 2 tablets (400 mg total) by mouth once daily., Disp: 60 tablet, Rfl: 11    methotrexate 2.5 MG Tab, Take 4 tablets once a  week on week 1 THEN take 5 tablets once a week on week 2 THEN take 6 tablets once a week on week 3 and onwards, Disp: 69 tablet, Rfl: 0    omega-3 fatty acids/fish oil (FISH OIL-OMEGA-3 FATTY ACIDS) 300-1,000 mg capsule, Take 1 capsule by mouth once daily., Disp: , Rfl:     predniSONE (DELTASONE) 5 MG tablet, Take 3 tablets (15 mg total) by mouth once daily for 5 days, THEN 2 tablets (10 mg total) once daily for 5 days, THEN 2 tablets (10 mg total) once daily., Disp: 205 tablet, Rfl: 0    prenatal vit,rafi 74-iron-folic 27 mg iron- 1 mg Tab, Take by mouth., Disp: , Rfl:      Objective:         Vitals:    12/16/24 1113   BP: 132/84   Pulse: 82         Physical Exam   Constitutional: She is oriented to person, place, and time. She appears well-developed.   HENT:   Head: Normocephalic.   Mouth/Throat: Mucous membranes are moist.   Pulmonary/Chest: Effort normal.   Abdominal: Soft.   Musculoskeletal:      Cervical back: Neck supple.      Comments: Cspine FROM no tenderness  Tspine FROM no tenderness  Lspine FROM no tenderness.  Shoulders: FROM; no synovitis;  Elbows: right elbow with normal ROM, previous nodule is resolved. Left elbow without tenderness but flexion contracture.   Wrists: right wrist with no ROM, unable to flex or extend wrist, mild tenderness and swelling.   MCPs: FROM; no synovitis, tenderness in multiple MCPs  PIPs:FROM; no synovitis;   DIPs: FROM; no synovitis;   HIPS: FROM  Knees: FROM; no synovitis; no instability  Ankles: right ankle with swelling and tenderness.   Toes: no tenderness on palpation.     Lymphadenopathy:     She has no cervical adenopathy.   Neurological: She is alert and oriented to person, place, and time.   Skin: No rash noted.   Vitals reviewed.    CDAI Score: 16.5 / 76         Reviewed old and all outside pertinent medical records available.    All lab results personally reviewed and interpreted by me.  Lab Results   Component Value Date    WBC 4.15 12/09/2024    HGB 11.0 (L)  "12/09/2024    HCT 34.2 (L) 12/09/2024    MCV 90 12/09/2024    MCH 28.9 12/09/2024    MCHC 32.2 12/09/2024    RDW 13.0 12/09/2024     12/09/2024    MPV 12.4 12/09/2024    PLTEST Adequate 05/01/2009       Lab Results   Component Value Date     (L) 12/09/2024    K 3.8 12/09/2024     12/09/2024    CO2 21 (L) 12/09/2024    GLU 81 12/09/2024    BUN 11 12/09/2024    CALCIUM 9.1 12/09/2024    PROT 8.0 12/09/2024    ALBUMIN 3.5 12/09/2024    BILITOT 0.2 12/09/2024    AST 13 12/09/2024    ALKPHOS 47 12/09/2024    ALT 13 12/09/2024       Lab Results   Component Value Date    COLORU Yellow 02/27/2023    APPEARANCEUA Clear 12/27/2021    SPECGRAV 1.010 12/27/2021    PHUR 6.0 12/27/2021    PROTEINUA Negative 12/27/2021    KETONESU Negative 12/27/2021    LEUKOCYTESUR Negative 02/27/2023    NITRITE Negative 12/27/2021    UROBILINOGEN 0.2 02/27/2023       Lab Results   Component Value Date    CRP 15.5 (H) 12/09/2024       Lab Results   Component Value Date    SEDRATE >120 (H) 12/09/2024       No components found for: "25OHVITDTOT", "45GYVSRH5", "86WTRRAL3", "METHODNOTE"    No results found for: "URICACID"    Lab Results   Component Value Date    HEPBSAB <3.00 04/22/2024    HEPBSAB Non-reactive 04/22/2024    HEPBSAG Non-reactive 04/22/2024    HEPCAB Non-reactive 04/22/2024       Imaging:  All imaging reviewed and independently interpreted by me.     ASSESSMENT / PLAN:     Rhea Sandra is a 39 y.o. Black or  female with:    Seropositive, nodular rheumatoid arthritis - active   -RF + / CCP +. Elevated ESR   -right elbow rheumatoid nodule that resolved but now has severely limited ROM in right wrist and left elbow flexion contracture.   -MRI bilateral hands with Advanced proximal inflammatory arthritic changes affecting the distal radioulnar joint, ulnocarpal joint and proximal carpal row.   -CDAI Score: 16.5 / 76    -had been on Cimzia 200 mg SC every 2 weeks since April 2024, however symptoms " have worsened and are uncontrolled with Cimzia.  -continue Enbrel 50 mg SC QW - started in late August but it  has not helped 100%  -continue  mg BID. Advised her to establish care with ophthalmology   -continue MTX with uptitration to 15 mg PO QW. Continue daily FA.   She is no interested on hormonal birth control or IUD. She is interested on using condoms at all times. We discussed risk and benefits. She should not get pregnant on MTX.   -may consider also stopping enbrel and using Ajay such as rinvoq. She will update me on symptoms in 2 weeks.   -continue prednisone 5 mg daily now     Right wrist swelling  -likely related to active RA.   -s/p right wrist CSI on 9/2024 that lasted 3 weeks.   -MRI bilateral hands showed advanced proximal inflammatory arthritic changes.      3. Drug induced immunodeficiency  - recent labs reviewed. Labs every 4 weeks for the next 3 months due to MTX   - no live vaccines  - vaccines per guidelines. Advised her to get shingles vaccine now   - immunosuppression/infectious precautions reinforced       4. Sjogren's syndrome, with unspecified organ involvement  -SSA + / SSB +  -negative GABO  -asymptomatic  -could be secondary to RA.      5. Vitamin D deficiency, resolved   -17 > 44  -Continue daily vitamin D supplement     6. Counseling and coordination of care  -referral to PCP placed last visit. She will see them on 12/4    Follow up in about 3 months (around 3/16/2025).    Method of contact with patient concerns: [facility]    Disclaimer:  This note is prepared using voice recognition software and as such is likely to have errors and has not been proof read. Please contact me for questions.     Time spent: 28 minutes in face to face discussion concerning diagnosis, prognosis, review of lab and test results, benefits of treatment as well as management of disease, counseling of patient and coordination of care between various health care providers.      Oriana  GÓMEZ Barber.  Rheumatology  [facility]

## 2025-01-13 ENCOUNTER — LAB VISIT (OUTPATIENT)
Dept: LAB | Facility: HOSPITAL | Age: 40
End: 2025-01-13
Attending: STUDENT IN AN ORGANIZED HEALTH CARE EDUCATION/TRAINING PROGRAM
Payer: MEDICAID

## 2025-01-13 DIAGNOSIS — D84.821 DRUG-INDUCED IMMUNODEFICIENCY: ICD-10-CM

## 2025-01-13 DIAGNOSIS — M05.79 RHEUMATOID ARTHRITIS INVOLVING MULTIPLE SITES WITH POSITIVE RHEUMATOID FACTOR: ICD-10-CM

## 2025-01-13 DIAGNOSIS — Z79.899 DRUG-INDUCED IMMUNODEFICIENCY: ICD-10-CM

## 2025-01-13 LAB
ALBUMIN SERPL BCP-MCNC: 4 G/DL (ref 3.5–5.2)
ALP SERPL-CCNC: 50 U/L (ref 40–150)
ALT SERPL W/O P-5'-P-CCNC: 16 U/L (ref 10–44)
ANION GAP SERPL CALC-SCNC: 9 MMOL/L (ref 8–16)
AST SERPL-CCNC: 19 U/L (ref 10–40)
BASOPHILS # BLD AUTO: 0.03 K/UL (ref 0–0.2)
BASOPHILS NFR BLD: 0.7 % (ref 0–1.9)
BILIRUB SERPL-MCNC: 0.4 MG/DL (ref 0.1–1)
BUN SERPL-MCNC: 13 MG/DL (ref 6–20)
CALCIUM SERPL-MCNC: 9.8 MG/DL (ref 8.7–10.5)
CHLORIDE SERPL-SCNC: 103 MMOL/L (ref 95–110)
CO2 SERPL-SCNC: 24 MMOL/L (ref 23–29)
CREAT SERPL-MCNC: 0.8 MG/DL (ref 0.5–1.4)
CRP SERPL-MCNC: 2.7 MG/L (ref 0–8.2)
DIFFERENTIAL METHOD BLD: ABNORMAL
EOSINOPHIL # BLD AUTO: 0.1 K/UL (ref 0–0.5)
EOSINOPHIL NFR BLD: 1.6 % (ref 0–8)
ERYTHROCYTE [DISTWIDTH] IN BLOOD BY AUTOMATED COUNT: 14.6 % (ref 11.5–14.5)
ERYTHROCYTE [SEDIMENTATION RATE] IN BLOOD BY PHOTOMETRIC METHOD: 80 MM/HR (ref 0–36)
EST. GFR  (NO RACE VARIABLE): >60 ML/MIN/1.73 M^2
GLUCOSE SERPL-MCNC: 84 MG/DL (ref 70–110)
HCT VFR BLD AUTO: 36.2 % (ref 37–48.5)
HGB BLD-MCNC: 11.6 G/DL (ref 12–16)
IMM GRANULOCYTES # BLD AUTO: 0.01 K/UL (ref 0–0.04)
IMM GRANULOCYTES NFR BLD AUTO: 0.2 % (ref 0–0.5)
LYMPHOCYTES # BLD AUTO: 1.9 K/UL (ref 1–4.8)
LYMPHOCYTES NFR BLD: 43.4 % (ref 18–48)
MCH RBC QN AUTO: 28.9 PG (ref 27–31)
MCHC RBC AUTO-ENTMCNC: 32 G/DL (ref 32–36)
MCV RBC AUTO: 90 FL (ref 82–98)
MONOCYTES # BLD AUTO: 0.5 K/UL (ref 0.3–1)
MONOCYTES NFR BLD: 12.2 % (ref 4–15)
NEUTROPHILS # BLD AUTO: 1.8 K/UL (ref 1.8–7.7)
NEUTROPHILS NFR BLD: 41.9 % (ref 38–73)
NRBC BLD-RTO: 0 /100 WBC
PLATELET # BLD AUTO: 273 K/UL (ref 150–450)
PMV BLD AUTO: 12.3 FL (ref 9.2–12.9)
POTASSIUM SERPL-SCNC: 4.2 MMOL/L (ref 3.5–5.1)
PROT SERPL-MCNC: 8.7 G/DL (ref 6–8.4)
RBC # BLD AUTO: 4.01 M/UL (ref 4–5.4)
SODIUM SERPL-SCNC: 136 MMOL/L (ref 136–145)
WBC # BLD AUTO: 4.33 K/UL (ref 3.9–12.7)

## 2025-01-13 PROCEDURE — 36415 COLL VENOUS BLD VENIPUNCTURE: CPT | Performed by: STUDENT IN AN ORGANIZED HEALTH CARE EDUCATION/TRAINING PROGRAM

## 2025-01-13 PROCEDURE — 86140 C-REACTIVE PROTEIN: CPT | Performed by: STUDENT IN AN ORGANIZED HEALTH CARE EDUCATION/TRAINING PROGRAM

## 2025-01-13 PROCEDURE — 80053 COMPREHEN METABOLIC PANEL: CPT | Performed by: STUDENT IN AN ORGANIZED HEALTH CARE EDUCATION/TRAINING PROGRAM

## 2025-01-13 PROCEDURE — 85652 RBC SED RATE AUTOMATED: CPT | Performed by: STUDENT IN AN ORGANIZED HEALTH CARE EDUCATION/TRAINING PROGRAM

## 2025-01-13 PROCEDURE — 85025 COMPLETE CBC W/AUTO DIFF WBC: CPT | Performed by: STUDENT IN AN ORGANIZED HEALTH CARE EDUCATION/TRAINING PROGRAM

## 2025-01-17 ENCOUNTER — PATIENT MESSAGE (OUTPATIENT)
Dept: RHEUMATOLOGY | Facility: CLINIC | Age: 40
End: 2025-01-17
Payer: MEDICAID

## 2025-02-24 DIAGNOSIS — M05.79 RHEUMATOID ARTHRITIS INVOLVING MULTIPLE SITES WITH POSITIVE RHEUMATOID FACTOR: ICD-10-CM

## 2025-02-24 RX ORDER — METHOTREXATE 2.5 MG/1
15 TABLET ORAL
Qty: 72 TABLET | Refills: 0 | Status: SHIPPED | OUTPATIENT
Start: 2025-02-24

## 2025-03-07 ENCOUNTER — PRE-OP/PRE-PROCEDURE ORDERS (OUTPATIENT)
Dept: RHEUMATOLOGY | Facility: CLINIC | Age: 40
End: 2025-03-07
Payer: MEDICAID

## 2025-03-07 ENCOUNTER — TELEPHONE (OUTPATIENT)
Dept: RHEUMATOLOGY | Facility: CLINIC | Age: 40
End: 2025-03-07
Payer: MEDICAID

## 2025-03-07 ENCOUNTER — PATIENT MESSAGE (OUTPATIENT)
Dept: RHEUMATOLOGY | Facility: CLINIC | Age: 40
End: 2025-03-07
Payer: MEDICAID

## 2025-03-07 DIAGNOSIS — M05.79 RHEUMATOID ARTHRITIS INVOLVING MULTIPLE SITES WITH POSITIVE RHEUMATOID FACTOR: ICD-10-CM

## 2025-03-07 RX ORDER — FOLIC ACID 1 MG/1
2 TABLET ORAL DAILY
Qty: 180 TABLET | Refills: 3 | Status: SHIPPED | OUTPATIENT
Start: 2025-03-07 | End: 2026-03-07

## 2025-03-07 RX ORDER — PREDNISONE 5 MG/1
TABLET ORAL
COMMUNITY
Start: 2025-02-02

## 2025-03-07 NOTE — TELEPHONE ENCOUNTER
Patient called stating she is experiencing hair loss since taking methotrexate. Will inform Dr. Armenta.

## 2025-03-07 NOTE — TELEPHONE ENCOUNTER
----- Message from Aziza sent at 3/7/2025 12:46 PM CST -----  Type:  Needs Medical AdviceWho Called: Pt Symptoms (please be specific): side effects from methotrexate 2.5 MG Tab : hair loss  How long has patient had these symptoms:  a month ago, more noticeable now Pharmacy name and phone #:  [id number] - [place] - [address] AT [facility] [id number] [place] [id number]: [phone] Would the patient rather a call back or a response via [facility]? Call Best Call Back Number: [phone] Additional Information:

## 2025-05-27 ENCOUNTER — TELEPHONE (OUTPATIENT)
Dept: RHEUMATOLOGY | Facility: CLINIC | Age: 40
End: 2025-05-27
Payer: MEDICAID

## 2025-05-27 DIAGNOSIS — M35.00 SJOGREN'S SYNDROME, WITH UNSPECIFIED ORGAN INVOLVEMENT: ICD-10-CM

## 2025-05-27 DIAGNOSIS — M05.79 RHEUMATOID ARTHRITIS INVOLVING MULTIPLE SITES WITH POSITIVE RHEUMATOID FACTOR: ICD-10-CM

## 2025-05-27 RX ORDER — HYDROXYCHLOROQUINE SULFATE 200 MG/1
400 TABLET, FILM COATED ORAL DAILY
Qty: 60 TABLET | Refills: 5 | Status: SHIPPED | OUTPATIENT
Start: 2025-05-27 | End: 2025-11-23

## 2025-05-30 ENCOUNTER — TELEPHONE (OUTPATIENT)
Dept: RHEUMATOLOGY | Facility: CLINIC | Age: 40
End: 2025-05-30
Payer: MEDICAID

## 2025-05-30 DIAGNOSIS — E55.9 VITAMIN D DEFICIENCY: ICD-10-CM

## 2025-05-30 RX ORDER — CHOLECALCIFEROL (VITAMIN D3) 1250 MCG
1250 TABLET ORAL
Qty: 12 TABLET | Refills: 0 | Status: SHIPPED | OUTPATIENT
Start: 2025-05-30

## 2025-06-18 ENCOUNTER — TELEPHONE (OUTPATIENT)
Dept: RHEUMATOLOGY | Facility: CLINIC | Age: 40
End: 2025-06-18
Payer: MEDICAID

## 2025-06-19 RX ORDER — PREDNISONE 5 MG/1
5 TABLET ORAL DAILY
Qty: 90 TABLET | Refills: 0 | Status: SHIPPED | OUTPATIENT
Start: 2025-06-19

## 2025-07-10 ENCOUNTER — TELEPHONE (OUTPATIENT)
Dept: RHEUMATOLOGY | Facility: CLINIC | Age: 40
End: 2025-07-10
Payer: MEDICAID

## 2025-07-10 ENCOUNTER — PATIENT MESSAGE (OUTPATIENT)
Dept: RHEUMATOLOGY | Facility: CLINIC | Age: 40
End: 2025-07-10
Payer: MEDICAID

## 2025-07-10 DIAGNOSIS — M05.79 RHEUMATOID ARTHRITIS INVOLVING MULTIPLE SITES WITH POSITIVE RHEUMATOID FACTOR: ICD-10-CM

## 2025-07-10 DIAGNOSIS — D84.821 DRUG-INDUCED IMMUNODEFICIENCY: ICD-10-CM

## 2025-07-10 DIAGNOSIS — M05.79 RHEUMATOID ARTHRITIS INVOLVING MULTIPLE SITES WITH POSITIVE RHEUMATOID FACTOR: Primary | ICD-10-CM

## 2025-07-10 DIAGNOSIS — Z79.899 DRUG-INDUCED IMMUNODEFICIENCY: ICD-10-CM

## 2025-07-10 RX ORDER — METHOTREXATE 2.5 MG/1
15 TABLET ORAL
Qty: 72 TABLET | Refills: 0 | Status: SHIPPED | OUTPATIENT
Start: 2025-07-10

## 2025-07-15 ENCOUNTER — LAB VISIT (OUTPATIENT)
Dept: LAB | Facility: HOSPITAL | Age: 40
End: 2025-07-15
Attending: STUDENT IN AN ORGANIZED HEALTH CARE EDUCATION/TRAINING PROGRAM
Payer: MEDICAID

## 2025-07-15 DIAGNOSIS — D84.821 DRUG-INDUCED IMMUNODEFICIENCY: ICD-10-CM

## 2025-07-15 DIAGNOSIS — Z79.899 DRUG-INDUCED IMMUNODEFICIENCY: ICD-10-CM

## 2025-07-15 DIAGNOSIS — M05.79 RHEUMATOID ARTHRITIS INVOLVING MULTIPLE SITES WITH POSITIVE RHEUMATOID FACTOR: ICD-10-CM

## 2025-07-15 LAB
ABSOLUTE EOSINOPHIL (OHS): 0.16 K/UL
ABSOLUTE MONOCYTE (OHS): 0.46 K/UL (ref 0.3–1)
ABSOLUTE NEUTROPHIL COUNT (OHS): 2.2 K/UL (ref 1.8–7.7)
ALBUMIN SERPL BCP-MCNC: 4 G/DL (ref 3.5–5.2)
ALP SERPL-CCNC: 54 UNIT/L (ref 40–150)
ALT SERPL W/O P-5'-P-CCNC: 11 UNIT/L (ref 10–44)
ANION GAP (OHS): 6 MMOL/L (ref 8–16)
AST SERPL-CCNC: 15 UNIT/L (ref 11–45)
B-HCG UR QL: NEGATIVE
BASOPHILS # BLD AUTO: 0.02 K/UL
BASOPHILS NFR BLD AUTO: 0.5 %
BILIRUB SERPL-MCNC: 0.6 MG/DL (ref 0.1–1)
BUN SERPL-MCNC: 8 MG/DL (ref 6–20)
CALCIUM SERPL-MCNC: 9.3 MG/DL (ref 8.7–10.5)
CHLORIDE SERPL-SCNC: 103 MMOL/L (ref 95–110)
CO2 SERPL-SCNC: 24 MMOL/L (ref 23–29)
CREAT SERPL-MCNC: 0.8 MG/DL (ref 0.5–1.4)
CRP SERPL-MCNC: 5.3 MG/L
ERYTHROCYTE [DISTWIDTH] IN BLOOD BY AUTOMATED COUNT: 13.9 % (ref 11.5–14.5)
ERYTHROCYTE [SEDIMENTATION RATE] IN BLOOD BY PHOTOMETRIC METHOD: 54 MM/HR
GFR SERPLBLD CREATININE-BSD FMLA CKD-EPI: >60 ML/MIN/1.73/M2
GLUCOSE SERPL-MCNC: 94 MG/DL (ref 70–110)
HCT VFR BLD AUTO: 36.8 % (ref 37–48.5)
HGB BLD-MCNC: 11.7 GM/DL (ref 12–16)
IMM GRANULOCYTES # BLD AUTO: 0.01 K/UL (ref 0–0.04)
IMM GRANULOCYTES NFR BLD AUTO: 0.2 % (ref 0–0.5)
LYMPHOCYTES # BLD AUTO: 1.48 K/UL (ref 1–4.8)
MCH RBC QN AUTO: 29.5 PG (ref 27–31)
MCHC RBC AUTO-ENTMCNC: 31.8 G/DL (ref 32–36)
MCV RBC AUTO: 93 FL (ref 82–98)
NUCLEATED RBC (/100WBC) (OHS): 0 /100 WBC
PLATELET # BLD AUTO: 237 K/UL (ref 150–450)
PMV BLD AUTO: 12.9 FL (ref 9.2–12.9)
POTASSIUM SERPL-SCNC: 4.3 MMOL/L (ref 3.5–5.1)
PROT SERPL-MCNC: 9 GM/DL (ref 6–8.4)
RBC # BLD AUTO: 3.97 M/UL (ref 4–5.4)
RELATIVE EOSINOPHIL (OHS): 3.7 %
RELATIVE LYMPHOCYTE (OHS): 34.2 % (ref 18–48)
RELATIVE MONOCYTE (OHS): 10.6 % (ref 4–15)
RELATIVE NEUTROPHIL (OHS): 50.8 % (ref 38–73)
SODIUM SERPL-SCNC: 133 MMOL/L (ref 136–145)
WBC # BLD AUTO: 4.33 K/UL (ref 3.9–12.7)

## 2025-07-15 PROCEDURE — 85652 RBC SED RATE AUTOMATED: CPT

## 2025-07-15 PROCEDURE — 86140 C-REACTIVE PROTEIN: CPT

## 2025-07-15 PROCEDURE — 84075 ASSAY ALKALINE PHOSPHATASE: CPT

## 2025-07-15 PROCEDURE — 85025 COMPLETE CBC W/AUTO DIFF WBC: CPT

## 2025-07-15 PROCEDURE — 81025 URINE PREGNANCY TEST: CPT

## 2025-07-15 PROCEDURE — 36415 COLL VENOUS BLD VENIPUNCTURE: CPT

## 2025-07-29 DIAGNOSIS — M05.79 RHEUMATOID ARTHRITIS INVOLVING MULTIPLE SITES WITH POSITIVE RHEUMATOID FACTOR: ICD-10-CM

## 2025-07-29 RX ORDER — METHOTREXATE 2.5 MG/1
15 TABLET ORAL
Qty: 72 TABLET | Refills: 0 | OUTPATIENT
Start: 2025-07-29

## 2025-08-01 ENCOUNTER — TELEPHONE (OUTPATIENT)
Dept: OBSTETRICS AND GYNECOLOGY | Facility: CLINIC | Age: 40
End: 2025-08-01
Payer: MEDICAID

## 2025-08-06 ENCOUNTER — OFFICE VISIT (OUTPATIENT)
Dept: OBSTETRICS AND GYNECOLOGY | Facility: CLINIC | Age: 40
End: 2025-08-06
Payer: MEDICAID

## 2025-08-06 VITALS
BODY MASS INDEX: 32.8 KG/M2 | HEIGHT: 59 IN | SYSTOLIC BLOOD PRESSURE: 110 MMHG | WEIGHT: 162.69 LBS | DIASTOLIC BLOOD PRESSURE: 80 MMHG

## 2025-08-06 DIAGNOSIS — Z30.019 ENCOUNTER FOR INITIAL PRESCRIPTION OF CONTRACEPTIVES, UNSPECIFIED CONTRACEPTIVE: ICD-10-CM

## 2025-08-06 DIAGNOSIS — N76.0 ACUTE VAGINITIS: ICD-10-CM

## 2025-08-06 DIAGNOSIS — Z01.419 WELL WOMAN EXAM WITH ROUTINE GYNECOLOGICAL EXAM: Primary | ICD-10-CM

## 2025-08-06 PROCEDURE — 3074F SYST BP LT 130 MM HG: CPT | Mod: CPTII,,, | Performed by: OBSTETRICS & GYNECOLOGY

## 2025-08-06 PROCEDURE — 99999 PR PBB SHADOW E&M-EST. PATIENT-LVL III: CPT | Mod: PBBFAC,,, | Performed by: OBSTETRICS & GYNECOLOGY

## 2025-08-06 PROCEDURE — 99395 PREV VISIT EST AGE 18-39: CPT | Mod: S$PBB,,, | Performed by: OBSTETRICS & GYNECOLOGY

## 2025-08-06 PROCEDURE — 99213 OFFICE O/P EST LOW 20 MIN: CPT | Mod: PBBFAC | Performed by: OBSTETRICS & GYNECOLOGY

## 2025-08-06 PROCEDURE — 3079F DIAST BP 80-89 MM HG: CPT | Mod: CPTII,,, | Performed by: OBSTETRICS & GYNECOLOGY

## 2025-08-06 PROCEDURE — 3008F BODY MASS INDEX DOCD: CPT | Mod: CPTII,,, | Performed by: OBSTETRICS & GYNECOLOGY

## 2025-08-06 PROCEDURE — 81515 NFCT DS BV&VAGINITIS DNA ALG: CPT | Performed by: OBSTETRICS & GYNECOLOGY

## 2025-08-06 PROCEDURE — 1159F MED LIST DOCD IN RCRD: CPT | Mod: CPTII,,, | Performed by: OBSTETRICS & GYNECOLOGY

## 2025-08-06 RX ORDER — METRONIDAZOLE 500 MG/1
500 TABLET ORAL EVERY 12 HOURS
Qty: 14 TABLET | Refills: 0 | Status: SHIPPED | OUTPATIENT
Start: 2025-08-06

## 2025-08-06 RX ORDER — LACTIC ACID, L-, CITRIC ACID MONOHYDRATE, AND POTASSIUM BITARTRATE 90; 50; 20 MG/5G; MG/5G; MG/5G
1 GEL VAGINAL DAILY PRN
Qty: 180 G | Refills: 11 | Status: SHIPPED | OUTPATIENT
Start: 2025-08-06

## 2025-08-09 LAB
BACTERIAL VAGINOSIS DNA (OHS): NOT DETECTED
CANDIDA GLABRATA/KRUSEI DNA (OHS): NOT DETECTED
CANDIDA SPECIES DNA (OHS): NOT DETECTED
TRICHOMONAS VAGINALIS DNA (OHS): NOT DETECTED

## 2025-08-12 DIAGNOSIS — M05.79 RHEUMATOID ARTHRITIS INVOLVING MULTIPLE SITES WITH POSITIVE RHEUMATOID FACTOR: ICD-10-CM

## 2025-08-12 RX ORDER — ETANERCEPT 50 MG/ML
50 SOLUTION SUBCUTANEOUS WEEKLY
Qty: 4 ML | Refills: 11 | Status: ACTIVE | OUTPATIENT
Start: 2025-08-12 | End: 2026-08-12

## 2025-08-29 ENCOUNTER — OFFICE VISIT (OUTPATIENT)
Dept: RHEUMATOLOGY | Facility: CLINIC | Age: 40
End: 2025-08-29
Payer: MEDICAID

## 2025-08-29 VITALS
HEIGHT: 59 IN | RESPIRATION RATE: 19 BRPM | WEIGHT: 162.94 LBS | SYSTOLIC BLOOD PRESSURE: 117 MMHG | DIASTOLIC BLOOD PRESSURE: 79 MMHG | OXYGEN SATURATION: 100 % | BODY MASS INDEX: 32.85 KG/M2 | HEART RATE: 76 BPM

## 2025-08-29 DIAGNOSIS — D84.821 DRUG-INDUCED IMMUNODEFICIENCY: Primary | ICD-10-CM

## 2025-08-29 DIAGNOSIS — M05.79 RHEUMATOID ARTHRITIS INVOLVING MULTIPLE SITES WITH POSITIVE RHEUMATOID FACTOR: ICD-10-CM

## 2025-08-29 DIAGNOSIS — M25.531 PAIN AND SWELLING OF RIGHT WRIST: ICD-10-CM

## 2025-08-29 DIAGNOSIS — Z79.899 DRUG-INDUCED IMMUNODEFICIENCY: Primary | ICD-10-CM

## 2025-08-29 DIAGNOSIS — M25.431 PAIN AND SWELLING OF RIGHT WRIST: ICD-10-CM

## 2025-08-29 DIAGNOSIS — E55.9 VITAMIN D DEFICIENCY: ICD-10-CM

## 2025-08-29 DIAGNOSIS — M35.00 SJOGREN'S SYNDROME, WITH UNSPECIFIED ORGAN INVOLVEMENT: ICD-10-CM

## 2025-08-29 PROCEDURE — 99999 PR PBB SHADOW E&M-EST. PATIENT-LVL IV: CPT | Mod: PBBFAC,,, | Performed by: STUDENT IN AN ORGANIZED HEALTH CARE EDUCATION/TRAINING PROGRAM

## 2025-08-29 PROCEDURE — 99214 OFFICE O/P EST MOD 30 MIN: CPT | Mod: PBBFAC,PN | Performed by: STUDENT IN AN ORGANIZED HEALTH CARE EDUCATION/TRAINING PROGRAM

## 2025-08-29 RX ORDER — METHYLPREDNISOLONE 4 MG/1
TABLET ORAL
Qty: 21 EACH | Refills: 0 | Status: SHIPPED | OUTPATIENT
Start: 2025-08-29 | End: 2025-09-19

## 2025-08-29 RX ORDER — FERROUS SULFATE 325(65) MG
TABLET ORAL
COMMUNITY
Start: 2025-06-03

## 2025-08-29 RX ORDER — SILVER SULFADIAZINE 10 G/1000G
CREAM TOPICAL
COMMUNITY

## 2025-08-29 RX ORDER — PIMECROLIMUS 10 MG/G
CREAM TOPICAL
COMMUNITY

## 2025-08-29 RX ORDER — TRETINOIN 0.1 MG/G
GEL TOPICAL
COMMUNITY

## 2025-08-29 RX ORDER — RUXOLITINIB 15 MG/G
CREAM TOPICAL
COMMUNITY

## 2025-08-29 RX ORDER — METHOTREXATE 2.5 MG/1
15 TABLET ORAL
Qty: 72 TABLET | Refills: 0 | Status: SHIPPED | OUTPATIENT
Start: 2025-08-29